# Patient Record
Sex: FEMALE | Employment: OTHER | URBAN - METROPOLITAN AREA
[De-identification: names, ages, dates, MRNs, and addresses within clinical notes are randomized per-mention and may not be internally consistent; named-entity substitution may affect disease eponyms.]

---

## 2014-05-08 LAB — DIABETIC RETINOPATHY: NEGATIVE

## 2017-05-10 PROBLEM — M79.621 PAIN OF RIGHT UPPER ARM: Status: ACTIVE | Noted: 2017-05-10

## 2018-10-30 LAB
AVERAGE GLUCOSE: NORMAL
CHOLESTEROL, TOTAL: 133 MG/DL
CHOLESTEROL/HDL RATIO: 2.8
CREATININE, URINE: 12.8
CREATININE: 0.9 MG/DL
HBA1C MFR BLD: 7.5 %
HDLC SERPL-MCNC: 48 MG/DL (ref 35–70)
LDL CHOLESTEROL CALCULATED: 58 MG/DL (ref 0–160)
MICROALBUMIN/CREAT 24H UR: 1.2 MG/G{CREAT}
MICROALBUMIN/CREAT UR-RTO: 94
POTASSIUM (K+): 3.6
TRIGL SERPL-MCNC: 199 MG/DL
VLDLC SERPL CALC-MCNC: NORMAL MG/DL

## 2019-03-26 VITALS
BODY MASS INDEX: 26.75 KG/M2 | DIASTOLIC BLOOD PRESSURE: 78 MMHG | TEMPERATURE: 98.6 F | WEIGHT: 151 LBS | SYSTOLIC BLOOD PRESSURE: 128 MMHG | HEART RATE: 80 BPM | HEIGHT: 63 IN

## 2019-03-26 RX ORDER — LIDOCAINE 50 MG/G
1 PATCH TOPICAL EVERY 12 HOURS
COMMUNITY
End: 2019-08-29

## 2019-03-26 RX ORDER — METOPROLOL SUCCINATE 50 MG/1
50 TABLET, EXTENDED RELEASE ORAL DAILY
COMMUNITY
End: 2019-05-15 | Stop reason: SDUPTHER

## 2019-03-26 RX ORDER — TRAMADOL HYDROCHLORIDE 50 MG/1
50 TABLET ORAL DAILY
COMMUNITY
End: 2019-11-13 | Stop reason: ALTCHOICE

## 2019-05-10 ENCOUNTER — HOSPITAL ENCOUNTER (OUTPATIENT)
Age: 84
Discharge: HOME | End: 2019-05-12
Payer: MEDICARE

## 2019-05-10 LAB
ALBUMIN SERPL-MCNC: 4.3 G/DL (ref 3.5–5.2)
ALP BLD-CCNC: 71 U/L (ref 35–104)
ALT SERPL-CCNC: 15 U/L (ref 0–32)
ANION GAP SERPL CALCULATED.3IONS-SCNC: 16 MMOL/L (ref 7–16)
AST SERPL-CCNC: 20 U/L (ref 0–31)
BACTERIA: ABNORMAL /HPF
BILIRUB SERPL-MCNC: 0.9 MG/DL (ref 0–1.2)
BILIRUBIN URINE: NEGATIVE
BLOOD, URINE: NEGATIVE
BUN BLDV-MCNC: 20 MG/DL (ref 8–23)
C-REACTIVE PROTEIN, HIGH SENSITIVITY: 3.1 MG/L (ref 0–3)
CALCIUM IONIZED: 1.33 MMOL/L (ref 1.15–1.33)
CALCIUM SERPL-MCNC: 9.9 MG/DL (ref 8.6–10.2)
CHLORIDE BLD-SCNC: 99 MMOL/L (ref 98–107)
CHOLESTEROL, TOTAL: 130 MG/DL (ref 0–199)
CLARITY: CLEAR
CO2: 24 MMOL/L (ref 22–29)
COLOR: YELLOW
CREAT SERPL-MCNC: 0.9 MG/DL (ref 0.5–1)
CREATININE URINE: 68 MG/DL (ref 29–226)
GFR AFRICAN AMERICAN: >60
GFR NON-AFRICAN AMERICAN: 59 ML/MIN/1.73
GLUCOSE BLD-MCNC: 179 MG/DL (ref 74–99)
GLUCOSE URINE: NEGATIVE MG/DL
HBA1C MFR BLD: 7.8 % (ref 4–5.6)
HCT VFR BLD CALC: 40.8 % (ref 34–48)
HDLC SERPL-MCNC: 50 MG/DL
HEMOGLOBIN: 13.6 G/DL (ref 11.5–15.5)
KETONES, URINE: NEGATIVE MG/DL
LDL CHOLESTEROL CALCULATED: 39 MG/DL (ref 0–99)
LEUKOCYTE ESTERASE, URINE: ABNORMAL
MAGNESIUM: 1.9 MG/DL (ref 1.6–2.6)
MCH RBC QN AUTO: 29.7 PG (ref 26–35)
MCHC RBC AUTO-ENTMCNC: 33.3 % (ref 32–34.5)
MCV RBC AUTO: 89.1 FL (ref 80–99.9)
MICROALBUMIN UR-MCNC: <12 MG/L
MICROALBUMIN/CREAT UR-RTO: ABNORMAL (ref 0–30)
NITRITE, URINE: NEGATIVE
PARATHYROID HORMONE INTACT: 65 PG/ML (ref 15–65)
PDW BLD-RTO: 13.1 FL (ref 11.5–15)
PH UA: 5.5 (ref 5–9)
PHOSPHORUS: 3.8 MG/DL (ref 2.5–4.5)
PLATELET # BLD: 213 E9/L (ref 130–450)
PMV BLD AUTO: 10.8 FL (ref 7–12)
POTASSIUM SERPL-SCNC: 3.8 MMOL/L (ref 3.5–5)
PROTEIN UA: NEGATIVE MG/DL
RBC # BLD: 4.58 E12/L (ref 3.5–5.5)
RBC UA: ABNORMAL /HPF (ref 0–2)
SODIUM BLD-SCNC: 139 MMOL/L (ref 132–146)
SPECIFIC GRAVITY UA: 1.01 (ref 1–1.03)
TOTAL PROTEIN: 6.9 G/DL (ref 6.4–8.3)
TRIGL SERPL-MCNC: 206 MG/DL (ref 0–149)
URIC ACID, SERUM: 4.1 MG/DL (ref 2.4–5.7)
UROBILINOGEN, URINE: 0.2 E.U./DL
VITAMIN D 25-HYDROXY: 35 NG/ML (ref 30–100)
VLDLC SERPL CALC-MCNC: 41 MG/DL
WBC # BLD: 9.8 E9/L (ref 4.5–11.5)
WBC UA: ABNORMAL /HPF (ref 0–5)

## 2019-05-10 PROCEDURE — 82044 UR ALBUMIN SEMIQUANTITATIVE: CPT

## 2019-05-10 PROCEDURE — 84100 ASSAY OF PHOSPHORUS: CPT

## 2019-05-10 PROCEDURE — 36415 COLL VENOUS BLD VENIPUNCTURE: CPT

## 2019-05-10 PROCEDURE — 82570 ASSAY OF URINE CREATININE: CPT

## 2019-05-10 PROCEDURE — 84550 ASSAY OF BLOOD/URIC ACID: CPT

## 2019-05-10 PROCEDURE — 83036 HEMOGLOBIN GLYCOSYLATED A1C: CPT

## 2019-05-10 PROCEDURE — 86141 C-REACTIVE PROTEIN HS: CPT

## 2019-05-10 PROCEDURE — 82330 ASSAY OF CALCIUM: CPT

## 2019-05-10 PROCEDURE — 83735 ASSAY OF MAGNESIUM: CPT

## 2019-05-10 PROCEDURE — 80053 COMPREHEN METABOLIC PANEL: CPT

## 2019-05-10 PROCEDURE — 85651 RBC SED RATE NONAUTOMATED: CPT

## 2019-05-10 PROCEDURE — 82306 VITAMIN D 25 HYDROXY: CPT

## 2019-05-10 PROCEDURE — 80061 LIPID PANEL: CPT

## 2019-05-10 PROCEDURE — 85027 COMPLETE CBC AUTOMATED: CPT

## 2019-05-10 PROCEDURE — 86140 C-REACTIVE PROTEIN: CPT

## 2019-05-10 PROCEDURE — 81001 URINALYSIS AUTO W/SCOPE: CPT

## 2019-05-10 PROCEDURE — 83970 ASSAY OF PARATHORMONE: CPT

## 2019-05-11 LAB
C-REACTIVE PROTEIN: 0.3 MG/DL (ref 0–0.4)
SEDIMENTATION RATE, ERYTHROCYTE: 1 MM/HR (ref 0–20)

## 2019-05-15 ENCOUNTER — TELEPHONE (OUTPATIENT)
Dept: RHEUMATOLOGY | Age: 84
End: 2019-05-15

## 2019-05-15 ENCOUNTER — OFFICE VISIT (OUTPATIENT)
Dept: PRIMARY CARE CLINIC | Age: 84
End: 2019-05-15
Payer: MEDICARE

## 2019-05-15 VITALS
HEIGHT: 63 IN | WEIGHT: 155 LBS | SYSTOLIC BLOOD PRESSURE: 130 MMHG | BODY MASS INDEX: 27.46 KG/M2 | DIASTOLIC BLOOD PRESSURE: 82 MMHG | HEART RATE: 80 BPM | OXYGEN SATURATION: 96 %

## 2019-05-15 DIAGNOSIS — I44.7 LBBB (LEFT BUNDLE BRANCH BLOCK): ICD-10-CM

## 2019-05-15 DIAGNOSIS — M85.80 OSTEOPENIA, UNSPECIFIED LOCATION: ICD-10-CM

## 2019-05-15 DIAGNOSIS — M51.9 INTERVERTEBRAL DISC DISORDER: ICD-10-CM

## 2019-05-15 DIAGNOSIS — I10 ESSENTIAL HYPERTENSION: ICD-10-CM

## 2019-05-15 DIAGNOSIS — E11.59 TYPE 2 DIABETES MELLITUS WITH OTHER CIRCULATORY COMPLICATION, WITH LONG-TERM CURRENT USE OF INSULIN (HCC): ICD-10-CM

## 2019-05-15 DIAGNOSIS — E55.9 VITAMIN D DEFICIENCY: ICD-10-CM

## 2019-05-15 DIAGNOSIS — F41.9 ANXIETY: ICD-10-CM

## 2019-05-15 DIAGNOSIS — E04.2 NONTOXIC MULTINODULAR GOITER: ICD-10-CM

## 2019-05-15 DIAGNOSIS — E78.2 MIXED HYPERLIPIDEMIA: ICD-10-CM

## 2019-05-15 DIAGNOSIS — E53.8 VITAMIN B12 DEFICIENCY: ICD-10-CM

## 2019-05-15 DIAGNOSIS — Z23 NEED FOR PROPHYLACTIC VACCINATION AND INOCULATION AGAINST VARICELLA: Primary | ICD-10-CM

## 2019-05-15 DIAGNOSIS — M35.3 POLYMYALGIA RHEUMATICA (HCC): ICD-10-CM

## 2019-05-15 DIAGNOSIS — Z79.4 TYPE 2 DIABETES MELLITUS WITH OTHER CIRCULATORY COMPLICATION, WITH LONG-TERM CURRENT USE OF INSULIN (HCC): ICD-10-CM

## 2019-05-15 DIAGNOSIS — M19.90 OSTEOARTHRITIS, UNSPECIFIED OSTEOARTHRITIS TYPE, UNSPECIFIED SITE: ICD-10-CM

## 2019-05-15 DIAGNOSIS — N18.30 STAGE 3 CHRONIC KIDNEY DISEASE (HCC): ICD-10-CM

## 2019-05-15 PROCEDURE — 81003 URINALYSIS AUTO W/O SCOPE: CPT | Performed by: FAMILY MEDICINE

## 2019-05-15 PROCEDURE — 99214 OFFICE O/P EST MOD 30 MIN: CPT | Performed by: FAMILY MEDICINE

## 2019-05-15 RX ORDER — AMLODIPINE BESYLATE 5 MG/1
5 TABLET ORAL 2 TIMES DAILY
Qty: 180 TABLET | Refills: 1 | Status: SHIPPED | OUTPATIENT
Start: 2019-05-15 | End: 2019-11-13 | Stop reason: SDUPTHER

## 2019-05-15 RX ORDER — ROSUVASTATIN CALCIUM 20 MG/1
20 TABLET, COATED ORAL DAILY
Qty: 90 TABLET | Refills: 1 | Status: SHIPPED | OUTPATIENT
Start: 2019-05-15 | End: 2019-11-13 | Stop reason: SDUPTHER

## 2019-05-15 RX ORDER — LISINOPRIL 20 MG/1
20 TABLET ORAL DAILY
Qty: 90 TABLET | Refills: 1 | Status: SHIPPED | OUTPATIENT
Start: 2019-05-15 | End: 2019-11-13 | Stop reason: SDUPTHER

## 2019-05-15 RX ORDER — POTASSIUM CHLORIDE 1.5 G/1.77G
10 POWDER, FOR SOLUTION ORAL 3 TIMES DAILY
Qty: 30 EACH | Refills: 3 | Status: SHIPPED | OUTPATIENT
Start: 2019-05-15 | End: 2019-07-30

## 2019-05-15 RX ORDER — HYDROCHLOROTHIAZIDE 12.5 MG/1
12.5 TABLET ORAL DAILY
Qty: 90 TABLET | Refills: 1 | Status: SHIPPED | OUTPATIENT
Start: 2019-05-15 | End: 2019-09-24 | Stop reason: SDUPTHER

## 2019-05-15 RX ORDER — METOPROLOL SUCCINATE 50 MG/1
50 TABLET, EXTENDED RELEASE ORAL DAILY
Qty: 90 TABLET | Refills: 1 | Status: SHIPPED | OUTPATIENT
Start: 2019-05-15 | End: 2019-11-13 | Stop reason: SDUPTHER

## 2019-05-15 ASSESSMENT — PATIENT HEALTH QUESTIONNAIRE - PHQ9
2. FEELING DOWN, DEPRESSED OR HOPELESS: 0
SUM OF ALL RESPONSES TO PHQ QUESTIONS 1-9: 0
1. LITTLE INTEREST OR PLEASURE IN DOING THINGS: 0
SUM OF ALL RESPONSES TO PHQ9 QUESTIONS 1 & 2: 0
SUM OF ALL RESPONSES TO PHQ QUESTIONS 1-9: 0

## 2019-05-15 ASSESSMENT — ENCOUNTER SYMPTOMS
COLOR CHANGE: 0
SINUS PRESSURE: 0
SINUS PAIN: 0
BACK PAIN: 1
EYE ITCHING: 0
SHORTNESS OF BREATH: 0
DIARRHEA: 0
BLOOD IN STOOL: 0
CONSTIPATION: 0
CHEST TIGHTNESS: 0
PHOTOPHOBIA: 0
NAUSEA: 0
SORE THROAT: 0
EYE REDNESS: 0
COUGH: 0
WHEEZING: 0
EYE PAIN: 0
ABDOMINAL PAIN: 0
VOMITING: 0
ABDOMINAL DISTENTION: 0

## 2019-05-15 NOTE — ASSESSMENT & PLAN NOTE
Recent renal functions normal, patient asking if she continues  to need to see nephrology, at this time I do not think she does, would however recommend maintaining adequate hydration and avoiding all NSAIDs.

## 2019-05-15 NOTE — PROGRESS NOTES
and previous complication of rib fractures. HPI: Diabetes, hyperlipidemia and hypertension. Taking medication as prescribed. No medication side effects noted. Diabetes: Diet: Somewhat compliant with diet plan. Foot Problems: Patient checks feet regularly. Reviewed bw in detail with patient, all questions addressed. Review of Systems   Constitutional: Negative for appetite change, fatigue, fever and unexpected weight change. HENT: Negative for congestion, ear discharge, ear pain, hearing loss, mouth sores, postnasal drip, sinus pressure, sinus pain, sneezing and sore throat. Eyes: Negative for photophobia, pain, redness and itching. Respiratory: Negative for cough, chest tightness, shortness of breath and wheezing. Cardiovascular: Negative for chest pain, palpitations and leg swelling. Gastrointestinal: Negative for abdominal distention, abdominal pain, blood in stool, constipation, diarrhea, nausea and vomiting. Endocrine: Negative for cold intolerance and heat intolerance. Genitourinary: Negative for difficulty urinating, dysuria, frequency, hematuria and urgency. Musculoskeletal: Positive for arthralgias, back pain, gait problem and myalgias. Negative for joint swelling, neck pain and neck stiffness. Skin: Negative for color change, pallor and wound. Allergic/Immunologic: Negative for environmental allergies and food allergies. Neurological: Negative for dizziness, seizures, syncope, weakness, light-headedness and headaches. Hematological: Negative for adenopathy. Psychiatric/Behavioral: Negative for agitation, confusion, dysphoric mood, sleep disturbance and suicidal ideas. The patient is not nervous/anxious and is not hyperactive.           Current Outpatient Medications:     rosuvastatin (CRESTOR) 20 MG tablet, Take 1 tablet by mouth daily, Disp: 90 tablet, Rfl: 1    potassium chloride (KLOR-CON) 20 MEQ packet, Take 10 mEq by mouth 3 times daily, Disp: 30 each, Rfl: 3   metoprolol succinate (TOPROL XL) 50 MG extended release tablet, Take 1 tablet by mouth daily, Disp: 90 tablet, Rfl: 1    lisinopril (PRINIVIL;ZESTRIL) 20 MG tablet, Take 1 tablet by mouth daily, Disp: 90 tablet, Rfl: 1    insulin lispro (HUMALOG KWIKPEN) 200 UNIT/ML SOPN pen, 14units q am, Disp: 3 pen, Rfl: 1    insulin glargine (BASAGLAR KWIKPEN) 100 UNIT/ML injection pen, Inject 20 Units into the skin nightly, Disp: 5 pen, Rfl: 1    hydrochlorothiazide (HYDRODIURIL) 12.5 MG tablet, Take 1 tablet by mouth daily, Disp: 90 tablet, Rfl: 1    amLODIPine (NORVASC) 5 MG tablet, Take 1 tablet by mouth 2 times daily, Disp: 180 tablet, Rfl: 1    zoster recombinant adjuvanted vaccine (SHINGRIX) 50 MCG/0.5ML SUSR injection, Inject 0.5 mLs into the muscle once for 1 dose 50 MCG IM then repeat 2-6 months., Disp: 1 each, Rfl: 1    ALPRAZolam (XANAX) 0.25 MG tablet, , Disp: , Rfl:     glucosamine-chondroitin (GLUCOSAMINE CHONDR COMPLEX) 500-400 MG CAPS, Take by mouth, Disp: , Rfl:     aspirin 81 MG tablet, Take 81 mg by mouth daily, Disp: , Rfl:     Coenzyme Q10 (CO Q 10) 10 MG CAPS, Take by mouth, Disp: , Rfl:     Calcium Carbonate-Vit D-Min (CALCIUM 1200 PO), Take by mouth, Disp: , Rfl:     Cranberry 200 MG CAPS, Take by mouth, Disp: , Rfl:     lidocaine (LIDODERM) 5 %, Place 1 patch onto the skin every 12 hours 12 hours on, 12 hours off., Disp: , Rfl:     traMADol (ULTRAM) 50 MG tablet, Take 50 mg by mouth daily. , Disp: , Rfl:     diclofenac sodium 1 % GEL, Apply pea size to right shoulder three times per day as needd, Disp: , Rfl:     metoprolol tartrate (LOPRESSOR) 25 MG tablet, Take 25 mg by mouth nightly, Disp: , Rfl:     predniSONE (DELTASONE) 5 MG tablet, Take 1 mg by mouth 3 times daily , Disp: , Rfl:     Probiotic Product (VSL#3 PO), Take 1 tablet by mouth 2 times daily, Disp: , Rfl:     Misc.  Devices (QUAD CANE) MISC, by Does not apply route, Disp: , Rfl:     vitamin D 1000 UNITS CAPS, Take 1 capsule by mouth 2 times daily, Disp: , Rfl:     Insulin Lispro Prot & Lispro (HUMALOG MIX 75/25 PEN SC), Inject into the skin Indications: 17 QAM; 5 at dinner, Disp: , Rfl:     Allergies   Allergen Reactions    Glucophage [Metformin Hcl] Diarrhea    Iv Dye [Iodides] Hives    Niacin     Niaspan [Niacin Er]      Night sweats    Pcn [Penicillins] Hives    Relafen [Nabumetone] Hives       Past Medical History:   Diagnosis Date    Anxiety     Chronic kidney disease     Decreased bone density     HTN (hypertension)     Hyperlipidemia     Hypertension     Intervertebral disc disorder     Nontoxic multinodular goiter     Osteoarthritis     Osteopenia     Polymyalgia rheumatica (HCC)     Type 2 diabetes mellitus (HCC)     Vitamin D deficiency     Wrist fracture, bilateral        Past Surgical History:   Procedure Laterality Date    APPENDECTOMY      HYSTERECTOMY         No family history on file.     Social History     Socioeconomic History    Marital status:      Spouse name: Not on file    Number of children: Not on file    Years of education: Not on file    Highest education level: Not on file   Occupational History    Not on file   Social Needs    Financial resource strain: Not on file    Food insecurity:     Worry: Not on file     Inability: Not on file    Transportation needs:     Medical: Not on file     Non-medical: Not on file   Tobacco Use    Smoking status: Never Smoker    Smokeless tobacco: Never Used   Substance and Sexual Activity    Alcohol use: No    Drug use: No    Sexual activity: Not on file   Lifestyle    Physical activity:     Days per week: Not on file     Minutes per session: Not on file    Stress: Not on file   Relationships    Social connections:     Talks on phone: Not on file     Gets together: Not on file     Attends Buddhist service: Not on file     Active member of club or organization: Not on file     Attends meetings of clubs or organizations: Not on file     Relationship status: Not on file    Intimate partner violence:     Fear of current or ex partner: Not on file     Emotionally abused: Not on file     Physically abused: Not on file     Forced sexual activity: Not on file   Other Topics Concern    Not on file   Social History Narrative    Not on file       Vitals:    05/15/19 1008   BP: 130/82   Pulse: 80   SpO2: 96%   Weight: 155 lb (70.3 kg)   Height: 5' 3\" (1.6 m)       Exam:  Physical Exam   Constitutional: She is oriented to person, place, and time. She appears well-developed and well-nourished. HENT:   Head: Normocephalic and atraumatic. Right Ear: External ear normal.   Left Ear: External ear normal.   Nose: Nose normal.   Mouth/Throat: Oropharynx is clear and moist.   B/l hearing aids present   Eyes: Pupils are equal, round, and reactive to light. Conjunctivae and EOM are normal.   Neck: Normal range of motion. Neck supple. Cardiovascular: Normal rate, regular rhythm, normal heart sounds and intact distal pulses. Pulmonary/Chest: Effort normal and breath sounds normal.   Abdominal: Soft. Bowel sounds are normal.   Musculoskeletal: Normal range of motion. Walks with a slow labored gait   Neurological: She is alert and oriented to person, place, and time. Weakness noted b/l lower ext, uses quad cane for ambulation purposes   Skin: Skin is warm and dry. No rash noted. Psychiatric: She has a normal mood and affect.        Assessment and Plan:    Problem List        Circulatory    Hypertension    Relevant Medications    metoprolol tartrate (LOPRESSOR) 25 MG tablet    aspirin 81 MG tablet    rosuvastatin (CRESTOR) 20 MG tablet    potassium chloride (KLOR-CON) 20 MEQ packet    metoprolol succinate (TOPROL XL) 50 MG extended release tablet    lisinopril (PRINIVIL;ZESTRIL) 20 MG tablet    hydrochlorothiazide (HYDRODIURIL) 12.5 MG tablet    amLODIPine (NORVASC) 5 MG tablet    Other Relevant Orders    Basic Metabolic Panel    CBC Auto Differential    Urinalysis    Type 2 diabetes mellitus with circulatory disorder, with long-term current use of insulin (HCC)     Recent hemoglobin D8A, and salicylate acid by endocrinologist, continue to follow endo. Patient up-to-date on diabetic eye exams and performs daily foot checks. Relevant Medications    metoprolol tartrate (LOPRESSOR) 25 MG tablet    aspirin 81 MG tablet    Insulin Lispro Prot & Lispro (HUMALOG MIX 75/25 PEN SC)    rosuvastatin (CRESTOR) 20 MG tablet    metoprolol succinate (TOPROL XL) 50 MG extended release tablet    lisinopril (PRINIVIL;ZESTRIL) 20 MG tablet    insulin lispro (HUMALOG KWIKPEN) 200 UNIT/ML SOPN pen    insulin glargine (BASAGLAR KWIKPEN) 100 UNIT/ML injection pen    hydrochlorothiazide (HYDRODIURIL) 12.5 MG tablet    amLODIPine (NORVASC) 5 MG tablet    LBBB (left bundle branch block)    Relevant Medications    metoprolol tartrate (LOPRESSOR) 25 MG tablet    aspirin 81 MG tablet    rosuvastatin (CRESTOR) 20 MG tablet    metoprolol succinate (TOPROL XL) 50 MG extended release tablet    lisinopril (PRINIVIL;ZESTRIL) 20 MG tablet    hydrochlorothiazide (HYDRODIURIL) 12.5 MG tablet    amLODIPine (NORVASC) 5 MG tablet       Endocrine    Nontoxic multinodular goiter    Relevant Orders    TSH without Reflex    T4, Free       Musculoskeletal and Integument    Osteopenia    Intervertebral disc disorder    Osteoarthritis    Relevant Medications    predniSONE (DELTASONE) 5 MG tablet    aspirin 81 MG tablet    triamcinolone acetonide (KENALOG-40) injection 40 mg (Completed)    traMADol (ULTRAM) 50 MG tablet       Genitourinary    Stage 3 chronic kidney disease (HCC)     Recent renal functions normal, patient asking if she continues  to need to see nephrology, at this time I do not think she does, would however recommend maintaining adequate hydration and avoiding all NSAIDs.             Other    Vitamin D deficiency    Relevant Orders    Vitamin D 25 Hydroxy    Hyperlipidemia Relevant Medications    metoprolol tartrate (LOPRESSOR) 25 MG tablet    aspirin 81 MG tablet    rosuvastatin (CRESTOR) 20 MG tablet    metoprolol succinate (TOPROL XL) 50 MG extended release tablet    lisinopril (PRINIVIL;ZESTRIL) 20 MG tablet    hydrochlorothiazide (HYDRODIURIL) 12.5 MG tablet    amLODIPine (NORVASC) 5 MG tablet    Other Relevant Orders    Lipid Panel    ALT    AST    Polymyalgia rheumatica (HCC)    Anxiety    Relevant Medications    ALPRAZolam (XANAX) 0.25 MG tablet    Need for prophylactic vaccination and inoculation against varicella - Primary    Relevant Medications    zoster recombinant adjuvanted vaccine [facility]) 50 MCG/0.5ML SUSR injection    Vitamin B12 deficiency    Relevant Orders    Vitamin B12           Return in about 6 months (around 11/15/2019) for 30 minute appt always.     Fasting bw prior  Orders Placed This Encounter   Procedures    Basic Metabolic Panel     Standing Status:   Future     Standing Expiration Date:   5/15/2020    CBC Auto Differential     Standing Status:   Future     Standing Expiration Date:   5/15/2020    Hemoglobin A1C     Standing Status:   Future     Standing Expiration Date:   5/15/2020    Lipid Panel     Standing Status:   Future     Standing Expiration Date:   5/15/2020     Order Specific Question:   Is Patient Fasting?/# of Hours     Answer:   12    Vitamin B12     Standing Status:   Future     Standing Expiration Date:   5/15/2020    ALT     Standing Status:   Future     Standing Expiration Date:   5/15/2020    AST     Standing Status:   Future     Standing Expiration Date:   5/15/2020    Urinalysis     Standing Status:   Future     Standing Expiration Date:   5/15/2020    MICROALBUMIN / CREATININE URINE RATIO     Standing Status:   Future     Standing Expiration Date:   5/15/2020    Vitamin D 25 Hydroxy     Standing Status:   Future     Standing Expiration Date:   5/15/2020    TSH without Reflex     Standing Status:   Future     Standing Expiration Date:   5/15/2020    T4, Free     Standing Status:   Future     Standing Expiration Date:   5/15/2020       Bessy Javed MD  5/15/2019  12:22 PM

## 2019-05-23 ENCOUNTER — OFFICE VISIT (OUTPATIENT)
Dept: RHEUMATOLOGY | Age: 84
End: 2019-05-23
Payer: MEDICARE

## 2019-05-23 VITALS
OXYGEN SATURATION: 96 % | SYSTOLIC BLOOD PRESSURE: 128 MMHG | HEART RATE: 62 BPM | TEMPERATURE: 97 F | WEIGHT: 155.4 LBS | HEIGHT: 63 IN | BODY MASS INDEX: 27.54 KG/M2 | DIASTOLIC BLOOD PRESSURE: 66 MMHG

## 2019-05-23 DIAGNOSIS — M35.3 POLYMYALGIA RHEUMATICA (HCC): Primary | ICD-10-CM

## 2019-05-23 DIAGNOSIS — M75.01 ADHESIVE CAPSULITIS OF RIGHT SHOULDER: ICD-10-CM

## 2019-05-23 PROCEDURE — 99214 OFFICE O/P EST MOD 30 MIN: CPT | Performed by: INTERNAL MEDICINE

## 2019-05-23 RX ORDER — PREDNISONE 1 MG/1
TABLET ORAL
Qty: 270 TABLET | Refills: 1 | Status: SHIPPED | OUTPATIENT
Start: 2019-05-23 | End: 2019-08-29 | Stop reason: SDUPTHER

## 2019-05-23 ASSESSMENT — ENCOUNTER SYMPTOMS
EYE ITCHING: 0
SHORTNESS OF BREATH: 0
COUGH: 0
WHEEZING: 0
BACK PAIN: 0
BLOOD IN STOOL: 0
SORE THROAT: 0
VOMITING: 0
PHOTOPHOBIA: 0
CONSTIPATION: 0
EYE PAIN: 0
ABDOMINAL PAIN: 0
EYE REDNESS: 0
CHEST TIGHTNESS: 0
DIARRHEA: 0

## 2019-05-23 ASSESSMENT — ROUTINE ASSESSMENT OF PATIENT INDEX DATA (RAPID3)
ON A SCALE OF ONE TO TEN, HOW MUCH PAIN HAVE YOU HAD BECAUSE OF YOUR CONDITION OVER THE PAST WEEK?: 7
TOTAL RAPID3 SCORE: 13
ON A SCALE OF ONE TO TEN, CONSIDERING ALL THE WAYS IN WHICH ILLNESS AND HEALTH CONDITIONS MAY AFFECT YOU AT THIS TIME, PLEASE INDICATE BELOW HOW YOU ARE DOING:: 6

## 2019-05-23 NOTE — PROGRESS NOTES
  predniSONE (DELTASONE) 1 MG tablet, Take 3 mg once daily, Disp: 270 tablet, Rfl: 1    rosuvastatin (CRESTOR) 20 MG tablet, Take 1 tablet by mouth daily, Disp: 90 tablet, Rfl: 1    potassium chloride (KLOR-CON) 20 MEQ packet, Take 10 mEq by mouth 3 times daily, Disp: 30 each, Rfl: 3    metoprolol succinate (TOPROL XL) 50 MG extended release tablet, Take 1 tablet by mouth daily, Disp: 90 tablet, Rfl: 1    lisinopril (PRINIVIL;ZESTRIL) 20 MG tablet, Take 1 tablet by mouth daily, Disp: 90 tablet, Rfl: 1    insulin lispro (HUMALOG KWIKPEN) 200 UNIT/ML SOPN pen, 14units q am, Disp: 3 pen, Rfl: 1    insulin glargine (BASAGLAR KWIKPEN) 100 UNIT/ML injection pen, Inject 20 Units into the skin nightly, Disp: 5 pen, Rfl: 1    hydrochlorothiazide (HYDRODIURIL) 12.5 MG tablet, Take 1 tablet by mouth daily, Disp: 90 tablet, Rfl: 1    amLODIPine (NORVASC) 5 MG tablet, Take 1 tablet by mouth 2 times daily, Disp: 180 tablet, Rfl: 1    lidocaine (LIDODERM) 5 %, Place 1 patch onto the skin every 12 hours 12 hours on, 12 hours off., Disp: , Rfl:     traMADol (ULTRAM) 50 MG tablet, Take 50 mg by mouth daily. , Disp: , Rfl:     diclofenac sodium 1 % GEL, Apply pea size to right shoulder three times per day as needd, Disp: , Rfl:     ALPRAZolam (XANAX) 0.25 MG tablet, , Disp: , Rfl:     Probiotic Product (VSL#3 PO), Take 1 tablet by mouth 2 times daily, Disp: , Rfl:     Misc.  Devices (QUAD CANE) MISC, by Does not apply route, Disp: , Rfl:     vitamin D 1000 UNITS CAPS, Take 1 capsule by mouth 2 times daily, Disp: , Rfl:     glucosamine-chondroitin (GLUCOSAMINE CHONDR COMPLEX) 500-400 MG CAPS, Take by mouth, Disp: , Rfl:     aspirin 81 MG tablet, Take 81 mg by mouth daily, Disp: , Rfl:     Coenzyme Q10 (CO Q 10) 10 MG CAPS, Take by mouth, Disp: , Rfl:     Insulin Lispro Prot & Lispro (HUMALOG MIX 75/25 PEN SC), Inject into the skin Indications: 17 QAM; 5 at dinner, Disp: , Rfl:     Calcium Carbonate-Vit D-Min (CALCIUM 1200 PO), Take by mouth, Disp: , Rfl:     Cranberry 200 MG CAPS, Take by mouth, Disp: , Rfl:   Allergies   Allergen Reactions    Glucophage [Metformin Hcl] Diarrhea    Iv Dye [Iodides] Hives    Niacin     Niaspan [Niacin Er]      Night sweats    Pcn [Penicillins] Hives    Relafen [Nabumetone] Hives           Past Medical History:   Diagnosis Date    Anxiety     Chronic kidney disease     Decreased bone density     HTN (hypertension)     Hyperlipidemia     Hypertension     Intervertebral disc disorder     Nontoxic multinodular goiter     Osteoarthritis     Osteopenia     Polymyalgia rheumatica (HCC)     Type 2 diabetes mellitus ([facility] [address]     Vitamin D deficiency     Wrist fracture, bilateral      No family history on file.    Past Surgical History:   Procedure Laterality Date    APPENDECTOMY      HYSTERECTOMY        Social History     Socioeconomic History    Marital status:      Spouse name: Not on file    Number of children: Not on file    Years of education: Not on file    Highest education level: Not on file   Occupational History    Not on file   Social Needs    Financial resource strain: Not on file    Food insecurity:     Worry: Not on file     Inability: Not on file    Transportation needs:     Medical: Not on file     Non-medical: Not on file   Tobacco Use    Smoking status: Never Smoker    Smokeless tobacco: Never Used   Substance and Sexual Activity    Alcohol use: No    Drug use: Never    Sexual activity: Not Currently     Partners: Male     Birth control/protection: Post-menopausal   Lifestyle    Physical activity:     Days per week: Not on file     Minutes per session: Not on file    Stress: Not on file   Relationships    Social connections:     Talks on phone: Not on file     Gets together: Not on file     Attends Worship service: Not on file     Active member of club or organization: Not on file     Attends meetings of clubs or organizations: Not on

## 2019-06-19 DIAGNOSIS — F41.9 ANXIETY: Primary | ICD-10-CM

## 2019-06-19 RX ORDER — ALPRAZOLAM 0.25 MG/1
0.25 TABLET ORAL NIGHTLY PRN
Qty: 30 TABLET | Refills: 0 | Status: SHIPPED | OUTPATIENT
Start: 2019-06-19 | End: 2019-07-19

## 2019-06-25 ENCOUNTER — TELEPHONE (OUTPATIENT)
Dept: PRIMARY CARE CLINIC | Age: 84
End: 2019-06-25

## 2019-06-25 ENCOUNTER — TELEPHONE (OUTPATIENT)
Dept: FAMILY MEDICINE CLINIC | Age: 84
End: 2019-06-25

## 2019-06-25 ENCOUNTER — TELEPHONE (OUTPATIENT)
Dept: RHEUMATOLOGY | Age: 84
End: 2019-06-25

## 2019-06-25 RX ORDER — PREDNISONE 10 MG/1
TABLET ORAL
Qty: 80 TABLET | Refills: 0 | Status: SHIPPED | OUTPATIENT
Start: 2019-06-25 | End: 2019-11-13

## 2019-06-25 NOTE — TELEPHONE ENCOUNTER
Pt calling in multiple times this date stating she is \"sore all over. \" wants to know what she can do for this

## 2019-06-25 NOTE — TELEPHONE ENCOUNTER
Looks like she has always taken one 20mg tablet daily. Check with pharmacy and make sure that she has not been refilling it sooner than prescribed.

## 2019-07-12 ENCOUNTER — TELEPHONE (OUTPATIENT)
Dept: FAMILY MEDICINE CLINIC | Age: 84
End: 2019-07-12

## 2019-07-15 NOTE — TELEPHONE ENCOUNTER
Is she completely off , if yes will monitor her for now, , go back to previous dose of prednisone if she develops recurrence of symptoms.

## 2019-07-29 ENCOUNTER — TELEPHONE (OUTPATIENT)
Dept: RHEUMATOLOGY | Age: 84
End: 2019-07-29

## 2019-07-29 NOTE — TELEPHONE ENCOUNTER
Pt has been off of prednisone since 7/12.  States she \"doesn't feel as good as she was\" and wants to know what she should do

## 2019-07-30 ENCOUNTER — PROCEDURE VISIT (OUTPATIENT)
Dept: PODIATRY | Age: 84
End: 2019-07-30
Payer: MEDICARE

## 2019-07-30 VITALS
SYSTOLIC BLOOD PRESSURE: 132 MMHG | HEIGHT: 64 IN | BODY MASS INDEX: 26.29 KG/M2 | DIASTOLIC BLOOD PRESSURE: 82 MMHG | WEIGHT: 154 LBS

## 2019-07-30 DIAGNOSIS — I73.9 PVD (PERIPHERAL VASCULAR DISEASE) (HCC): ICD-10-CM

## 2019-07-30 DIAGNOSIS — M79.674 PAIN OF TOE OF RIGHT FOOT: ICD-10-CM

## 2019-07-30 DIAGNOSIS — R26.2 DIFFICULTY WALKING: ICD-10-CM

## 2019-07-30 DIAGNOSIS — M21.371 FOOT DROP, RIGHT FOOT: ICD-10-CM

## 2019-07-30 DIAGNOSIS — B35.1 ONYCHOMYCOSIS: Primary | ICD-10-CM

## 2019-07-30 DIAGNOSIS — E11.51 TYPE II DIABETES MELLITUS WITH PERIPHERAL CIRCULATORY DISORDER (HCC): ICD-10-CM

## 2019-07-30 DIAGNOSIS — M79.675 PAIN OF TOE OF LEFT FOOT: ICD-10-CM

## 2019-07-30 PROCEDURE — 11721 DEBRIDE NAIL 6 OR MORE: CPT | Performed by: PODIATRIST

## 2019-07-30 RX ORDER — POTASSIUM CHLORIDE 750 MG/1
CAPSULE, EXTENDED RELEASE ORAL
Qty: 270 CAPSULE | Refills: 3 | Status: SHIPPED | OUTPATIENT
Start: 2019-07-30 | End: 2019-11-13 | Stop reason: SDUPTHER

## 2019-07-30 RX ORDER — POTASSIUM CHLORIDE 750 MG/1
CAPSULE, EXTENDED RELEASE ORAL
COMMUNITY
End: 2019-07-30 | Stop reason: SDUPTHER

## 2019-07-30 NOTE — PROGRESS NOTES
19     Dilip Delicia    : 1928  Sex: female  Age: 80 y.o. Subjective: The patient is seen today for evaluation regarding Diabetic foot evaluation and mycotic nail care. No other complaints noted. Chief Complaint   Patient presents with    Nail Problem    Callouses       Current Medications:    Current Outpatient Medications:     potassium chloride (MICRO-K) 10 MEQ extended release capsule, , Disp: , Rfl:     predniSONE (DELTASONE) 10 MG tablet, Take 40 mg of Prednisone once a day for 5 days and decrease by 10 mg every 5 days. , Disp: 80 tablet, Rfl: 0    aspirin 81 MG tablet, Take 1 tablet by mouth daily, Disp: 90 tablet, Rfl: 1    predniSONE (DELTASONE) 1 MG tablet, Take 3 mg once daily, Disp: 270 tablet, Rfl: 1    rosuvastatin (CRESTOR) 20 MG tablet, Take 1 tablet by mouth daily, Disp: 90 tablet, Rfl: 1    potassium chloride (KLOR-CON) 20 MEQ packet, Take 10 mEq by mouth 3 times daily, Disp: 30 each, Rfl: 3    metoprolol succinate (TOPROL XL) 50 MG extended release tablet, Take 1 tablet by mouth daily, Disp: 90 tablet, Rfl: 1    lisinopril (PRINIVIL;ZESTRIL) 20 MG tablet, Take 1 tablet by mouth daily, Disp: 90 tablet, Rfl: 1    insulin lispro (HUMALOG KWIKPEN) 200 UNIT/ML SOPN pen, 14units q am, Disp: 3 pen, Rfl: 1    insulin glargine (BASAGLAR KWIKPEN) 100 UNIT/ML injection pen, Inject 20 Units into the skin nightly, Disp: 5 pen, Rfl: 1    hydrochlorothiazide (HYDRODIURIL) 12.5 MG tablet, Take 1 tablet by mouth daily, Disp: 90 tablet, Rfl: 1    amLODIPine (NORVASC) 5 MG tablet, Take 1 tablet by mouth 2 times daily, Disp: 180 tablet, Rfl: 1    lidocaine (LIDODERM) 5 %, Place 1 patch onto the skin every 12 hours 12 hours on, 12 hours off., Disp: , Rfl:     traMADol (ULTRAM) 50 MG tablet, Take 50 mg by mouth daily. , Disp: , Rfl:     diclofenac sodium 1 % GEL, Apply pea size to right shoulder three times per day as needd, Disp: , Rfl:     Probiotic Product (VSL#3 PO), Take digital areas bilateral foot. No heel fissuring or macerations of the web spaces. No plantar calluses and/or ulcerative areas are noted. Patient is having difficulty with gait/walking. Plan Per Assessment  Estela Noland was seen today for nail problem and callouses. Diagnoses and all orders for this visit:    Onychomycosis    Foot drop, right foot  -     Amb External Referral For Orthotics    Pain of toe of right foot    Pain of toe of left foot    PVD (peripheral vascular disease) ([address]    Type II diabetes mellitus with peripheral circulatory disorder (HCC)    Difficulty walking        1. Evaluation and Management  2. Manual and electrical debridement of the mycotic nails was performed for thickness and length to prevent injection and/or ulceration. 3. I recommended antifungal cream to the nails daily. 4. It was discussed in detail with the patient proper caring for the vascular compromised foot. The fact that they have compromised blood flow put the patient at risk for infection/gangrene/amputation. The patient should not walk barefoot. Shoe gear should fit properly and socks should be worn with shoes. Exercise is very important to prevent worsening of the disease process but before performing an exercise program should check with their family physician first.  If any skin lesions are noted, they are instructed to contact the office immediately. 5. It was discussed in detail with the patient proper hygiene for the diabetic foot. They are to get in the habit of looking at their feet or have someone look at them. If they are unable to do daily, they are to look for any signs of redness, blistering, cracking, swelling, drainage, open lesions, etc.  They are to dry in between the toes after each bath or shower gently. The water should be tested with the elbow to prevent burns. The patient is to refrain from soaking their feet unless instructed by myself to do otherwise.   They are to refrain from going barefoot. Shoe gear should be inspected for any foreign objects. Shoes should have a deep wide toe box. With any type of shoe, the feet should be inspected for any signs of pressure, i.e. redness, blistering, or open sores. Further instructional guidelines were dispensed to the patient. 6. We will see the patient back at a later date for continued podiatric management and care. Patient was advised to call the office with any questions or concerns prior to their next appointment if needed. Seen By:    Maci Eckert DPM    Electronically signed by Maci Eckert DPM on 7/30/2019 at 9:05 AM      This note was created using voice recognition software. The note was reviewed however may contain grammatical errors.

## 2019-08-29 ENCOUNTER — OFFICE VISIT (OUTPATIENT)
Dept: RHEUMATOLOGY | Age: 84
End: 2019-08-29
Payer: MEDICARE

## 2019-08-29 VITALS
TEMPERATURE: 97.2 F | HEIGHT: 64 IN | OXYGEN SATURATION: 96 % | BODY MASS INDEX: 26.29 KG/M2 | HEART RATE: 64 BPM | DIASTOLIC BLOOD PRESSURE: 72 MMHG | WEIGHT: 154 LBS | RESPIRATION RATE: 18 BRPM | SYSTOLIC BLOOD PRESSURE: 134 MMHG

## 2019-08-29 DIAGNOSIS — M35.3 POLYMYALGIA RHEUMATICA (HCC): Primary | ICD-10-CM

## 2019-08-29 PROCEDURE — 99214 OFFICE O/P EST MOD 30 MIN: CPT | Performed by: INTERNAL MEDICINE

## 2019-08-29 RX ORDER — PREDNISONE 1 MG/1
TABLET ORAL
Qty: 270 TABLET | Refills: 1 | Status: SHIPPED | OUTPATIENT
Start: 2019-08-29 | End: 2019-11-13

## 2019-08-29 RX ORDER — LIDOCAINE 50 MG/G
1 PATCH TOPICAL DAILY
Qty: 30 PATCH | Refills: 1 | Status: SHIPPED | OUTPATIENT
Start: 2019-08-29 | End: 2019-09-28

## 2019-08-29 ASSESSMENT — ENCOUNTER SYMPTOMS
CHEST TIGHTNESS: 0
SORE THROAT: 0
ABDOMINAL PAIN: 0
WHEEZING: 0
COUGH: 0
BACK PAIN: 0
BLOOD IN STOOL: 0
CONSTIPATION: 0
VOMITING: 0
EYE PAIN: 0
PHOTOPHOBIA: 0
EYE ITCHING: 0
SHORTNESS OF BREATH: 0
DIARRHEA: 0
EYE REDNESS: 0

## 2019-08-29 NOTE — PROGRESS NOTES
dizziness, seizures, syncope, weakness, light-headedness, numbness and headaches. Hematological: Negative for adenopathy. Does not bruise/bleed easily. Psychiatric/Behavioral: The patient is not nervous/anxious. Current Outpatient Medications:     lidocaine (LIDODERM) 5 %, Place 1 patch onto the skin daily 12 hours on, 12 hours off., Disp: 30 patch, Rfl: 1    predniSONE (DELTASONE) 1 MG tablet, Take 3 mg once daily, Disp: 270 tablet, Rfl: 1    potassium chloride (MICRO-K) 10 MEQ extended release capsule, 3 po daily, Disp: 270 capsule, Rfl: 3    predniSONE (DELTASONE) 10 MG tablet, Take 40 mg of Prednisone once a day for 5 days and decrease by 10 mg every 5 days. , Disp: 80 tablet, Rfl: 0    aspirin 81 MG tablet, Take 1 tablet by mouth daily, Disp: 90 tablet, Rfl: 1    rosuvastatin (CRESTOR) 20 MG tablet, Take 1 tablet by mouth daily, Disp: 90 tablet, Rfl: 1    metoprolol succinate (TOPROL XL) 50 MG extended release tablet, Take 1 tablet by mouth daily, Disp: 90 tablet, Rfl: 1    lisinopril (PRINIVIL;ZESTRIL) 20 MG tablet, Take 1 tablet by mouth daily, Disp: 90 tablet, Rfl: 1    insulin lispro (HUMALOG KWIKPEN) 200 UNIT/ML SOPN pen, 14units q am, Disp: 3 pen, Rfl: 1    insulin glargine (BASAGLAR KWIKPEN) 100 UNIT/ML injection pen, Inject 20 Units into the skin nightly, Disp: 5 pen, Rfl: 1    hydrochlorothiazide (HYDRODIURIL) 12.5 MG tablet, Take 1 tablet by mouth daily, Disp: 90 tablet, Rfl: 1    amLODIPine (NORVASC) 5 MG tablet, Take 1 tablet by mouth 2 times daily, Disp: 180 tablet, Rfl: 1    traMADol (ULTRAM) 50 MG tablet, Take 50 mg by mouth daily. , Disp: , Rfl:     diclofenac sodium 1 % GEL, Apply pea size to right shoulder three times per day as needd, Disp: , Rfl:     Probiotic Product (VSL#3 PO), Take 1 tablet by mouth 2 times daily, Disp: , Rfl:     Misc.  Devices (QUAD CANE) MISC, by Does not apply route, Disp: , Rfl:     vitamin D 1000 UNITS CAPS, Take 1 capsule by mouth 2

## 2019-09-04 ENCOUNTER — TELEPHONE (OUTPATIENT)
Dept: PRIMARY CARE CLINIC | Age: 84
End: 2019-09-04

## 2019-09-04 DIAGNOSIS — F41.9 ANXIETY: Primary | ICD-10-CM

## 2019-09-04 RX ORDER — ALPRAZOLAM 0.25 MG/1
0.25 TABLET ORAL NIGHTLY PRN
COMMUNITY
End: 2019-09-04 | Stop reason: SDUPTHER

## 2019-09-04 RX ORDER — ALPRAZOLAM 0.25 MG/1
0.25 TABLET ORAL NIGHTLY PRN
Qty: 30 TABLET | Refills: 0 | Status: SHIPPED | OUTPATIENT
Start: 2019-09-04 | End: 2019-11-27 | Stop reason: SDUPTHER

## 2019-09-17 ENCOUNTER — PROCEDURE VISIT (OUTPATIENT)
Dept: PODIATRY | Age: 84
End: 2019-09-17
Payer: MEDICARE

## 2019-09-17 VITALS — RESPIRATION RATE: 18 BRPM | BODY MASS INDEX: 30.82 KG/M2 | WEIGHT: 157 LBS | HEIGHT: 60 IN

## 2019-09-17 DIAGNOSIS — E11.51 TYPE II DIABETES MELLITUS WITH PERIPHERAL CIRCULATORY DISORDER (HCC): ICD-10-CM

## 2019-09-17 DIAGNOSIS — L60.0 OC (ONYCHOCRYPTOSIS): Primary | ICD-10-CM

## 2019-09-17 DIAGNOSIS — M79.674 PAIN OF TOE OF RIGHT FOOT: ICD-10-CM

## 2019-09-17 PROCEDURE — G8428 CUR MEDS NOT DOCUMENT: HCPCS | Performed by: PODIATRIST

## 2019-09-17 PROCEDURE — 1036F TOBACCO NON-USER: CPT | Performed by: PODIATRIST

## 2019-09-17 PROCEDURE — G8417 CALC BMI ABV UP PARAM F/U: HCPCS | Performed by: PODIATRIST

## 2019-09-17 PROCEDURE — 1123F ACP DISCUSS/DSCN MKR DOCD: CPT | Performed by: PODIATRIST

## 2019-09-17 PROCEDURE — 1090F PRES/ABSN URINE INCON ASSESS: CPT | Performed by: PODIATRIST

## 2019-09-17 PROCEDURE — 4040F PNEUMOC VAC/ADMIN/RCVD: CPT | Performed by: PODIATRIST

## 2019-09-17 PROCEDURE — 99213 OFFICE O/P EST LOW 20 MIN: CPT | Performed by: PODIATRIST

## 2019-09-17 NOTE — PROGRESS NOTES
peripheral circulatory disorder ([facility] [address]      1. Evaluation and management  2. Treatment ingrown nail border was performed to patient tolerance. No nail matrix procedure performed on today's visit. It was discussed in detail with the patient proper hygiene for the diabetic foot. They are to get in the habit of looking at their feet or have someone look at them. If they are unable to do daily, they are to look for any signs of redness, blistering, cracking, swelling, drainage, open lesions, etc.  They are to dry in between the toes after each bath or shower gently. The water should be tested with the elbow to prevent burns. The patient is to refrain from soaking their feet unless instructed by myself to do otherwise. They are to refrain from going barefoot. Shoe gear should be inspected for any foreign objects. Shoes should have a deep wide toe box. With any type of shoe, the feet should be inspected for any signs of pressure, i.e. redness, blistering, or open sores. Further instructional guidelines were dispensed to the patient. Patient will be followed up for her regular scheduled diabetic foot care appointment or sooner if needed. Seen By:    Carolina Mendez DPM    Electronically signed by Carolina Mendez DPM on 9/17/2019 at 11:29 AM    This note was created using voice recognition software. The note was reviewed however may contain grammatical errors.

## 2019-09-24 ENCOUNTER — TELEPHONE (OUTPATIENT)
Dept: PRIMARY CARE CLINIC | Age: 84
End: 2019-09-24

## 2019-09-24 DIAGNOSIS — I10 ESSENTIAL HYPERTENSION: ICD-10-CM

## 2019-09-24 DIAGNOSIS — M35.3 POLYMYALGIA RHEUMATICA (HCC): Primary | ICD-10-CM

## 2019-09-24 RX ORDER — HYDROCHLOROTHIAZIDE 12.5 MG/1
12.5 TABLET ORAL DAILY
Qty: 90 TABLET | Refills: 1 | Status: SHIPPED | OUTPATIENT
Start: 2019-09-24 | End: 2019-11-13 | Stop reason: SDUPTHER

## 2019-10-01 ENCOUNTER — TELEPHONE (OUTPATIENT)
Dept: PRIMARY CARE CLINIC | Age: 84
End: 2019-10-01

## 2019-10-25 ENCOUNTER — HOSPITAL ENCOUNTER (OUTPATIENT)
Age: 84
Discharge: HOME | End: 2019-10-27
Payer: MEDICARE

## 2019-10-25 LAB
C-REACTIVE PROTEIN: 0.7 MG/DL (ref 0–0.4)
SEDIMENTATION RATE, ERYTHROCYTE: 2 MM/HR (ref 0–20)
VITAMIN D 25-HYDROXY: 33 NG/ML (ref 30–100)

## 2019-10-25 PROCEDURE — 86140 C-REACTIVE PROTEIN: CPT

## 2019-10-25 PROCEDURE — 36415 COLL VENOUS BLD VENIPUNCTURE: CPT

## 2019-10-25 PROCEDURE — 85651 RBC SED RATE NONAUTOMATED: CPT

## 2019-10-25 PROCEDURE — 82306 VITAMIN D 25 HYDROXY: CPT

## 2019-11-05 ENCOUNTER — TELEPHONE (OUTPATIENT)
Dept: PRIMARY CARE CLINIC | Age: 84
End: 2019-11-05

## 2019-11-05 ENCOUNTER — PROCEDURE VISIT (OUTPATIENT)
Dept: PODIATRY | Age: 84
End: 2019-11-05
Payer: MEDICARE

## 2019-11-05 VITALS — BODY MASS INDEX: 26.63 KG/M2 | WEIGHT: 156 LBS | RESPIRATION RATE: 18 BRPM | HEIGHT: 64 IN

## 2019-11-05 DIAGNOSIS — B35.1 ONYCHOMYCOSIS: Primary | ICD-10-CM

## 2019-11-05 DIAGNOSIS — E11.51 TYPE II DIABETES MELLITUS WITH PERIPHERAL CIRCULATORY DISORDER (HCC): ICD-10-CM

## 2019-11-05 DIAGNOSIS — R26.2 DIFFICULTY WALKING: ICD-10-CM

## 2019-11-05 DIAGNOSIS — M79.674 PAIN OF TOE OF RIGHT FOOT: ICD-10-CM

## 2019-11-05 DIAGNOSIS — I73.9 PVD (PERIPHERAL VASCULAR DISEASE) (HCC): ICD-10-CM

## 2019-11-05 DIAGNOSIS — M79.675 PAIN OF TOE OF LEFT FOOT: ICD-10-CM

## 2019-11-05 PROCEDURE — 11721 DEBRIDE NAIL 6 OR MORE: CPT | Performed by: PODIATRIST

## 2019-11-07 ENCOUNTER — TELEPHONE (OUTPATIENT)
Dept: PRIMARY CARE CLINIC | Age: 84
End: 2019-11-07

## 2019-11-11 ENCOUNTER — HOSPITAL ENCOUNTER (OUTPATIENT)
Age: 84
Discharge: HOME | End: 2019-11-13
Payer: MEDICARE

## 2019-11-11 DIAGNOSIS — E04.2 NONTOXIC MULTINODULAR GOITER: ICD-10-CM

## 2019-11-11 DIAGNOSIS — M35.3 POLYMYALGIA RHEUMATICA (HCC): ICD-10-CM

## 2019-11-11 DIAGNOSIS — E11.59 TYPE 2 DIABETES MELLITUS WITH OTHER CIRCULATORY COMPLICATION, WITH LONG-TERM CURRENT USE OF INSULIN (HCC): ICD-10-CM

## 2019-11-11 DIAGNOSIS — E78.2 MIXED HYPERLIPIDEMIA: ICD-10-CM

## 2019-11-11 DIAGNOSIS — I10 ESSENTIAL HYPERTENSION: ICD-10-CM

## 2019-11-11 DIAGNOSIS — E53.8 VITAMIN B12 DEFICIENCY: ICD-10-CM

## 2019-11-11 DIAGNOSIS — Z79.4 TYPE 2 DIABETES MELLITUS WITH OTHER CIRCULATORY COMPLICATION, WITH LONG-TERM CURRENT USE OF INSULIN (HCC): ICD-10-CM

## 2019-11-11 DIAGNOSIS — E55.9 VITAMIN D DEFICIENCY: ICD-10-CM

## 2019-11-11 PROCEDURE — 82044 UR ALBUMIN SEMIQUANTITATIVE: CPT

## 2019-11-11 PROCEDURE — 81001 URINALYSIS AUTO W/SCOPE: CPT

## 2019-11-11 PROCEDURE — 82570 ASSAY OF URINE CREATININE: CPT

## 2019-11-12 LAB
AMORPHOUS: ABNORMAL
BACTERIA: ABNORMAL /HPF
BILIRUBIN URINE: NEGATIVE
BLOOD, URINE: NEGATIVE
CLARITY: ABNORMAL
COLOR: YELLOW
CREATININE URINE: 59 MG/DL (ref 29–226)
EPITHELIAL CELLS, UA: ABNORMAL /HPF
GLUCOSE URINE: 100 MG/DL
KETONES, URINE: NEGATIVE MG/DL
LEUKOCYTE ESTERASE, URINE: NEGATIVE
MICROALBUMIN UR-MCNC: <12 MG/L
MICROALBUMIN/CREAT UR-RTO: ABNORMAL (ref 0–30)
NITRITE, URINE: NEGATIVE
PH UA: 7.5 (ref 5–9)
PROTEIN UA: NEGATIVE MG/DL
RBC UA: ABNORMAL /HPF (ref 0–2)
SPECIFIC GRAVITY UA: 1.01 (ref 1–1.03)
UROBILINOGEN, URINE: 0.2 E.U./DL
WBC UA: ABNORMAL /HPF (ref 0–5)

## 2019-11-13 ENCOUNTER — HOSPITAL ENCOUNTER (OUTPATIENT)
Age: 84
Discharge: HOME | End: 2019-11-15
Payer: MEDICARE

## 2019-11-13 ENCOUNTER — OFFICE VISIT (OUTPATIENT)
Dept: PRIMARY CARE CLINIC | Age: 84
End: 2019-11-13
Payer: MEDICARE

## 2019-11-13 VITALS
TEMPERATURE: 97.6 F | HEIGHT: 64 IN | WEIGHT: 152 LBS | SYSTOLIC BLOOD PRESSURE: 136 MMHG | HEART RATE: 69 BPM | OXYGEN SATURATION: 96 % | BODY MASS INDEX: 25.95 KG/M2 | DIASTOLIC BLOOD PRESSURE: 74 MMHG

## 2019-11-13 DIAGNOSIS — M35.3 POLYMYALGIA RHEUMATICA (HCC): ICD-10-CM

## 2019-11-13 DIAGNOSIS — I10 ESSENTIAL HYPERTENSION: ICD-10-CM

## 2019-11-13 DIAGNOSIS — I73.9 PVD (PERIPHERAL VASCULAR DISEASE) (HCC): ICD-10-CM

## 2019-11-13 DIAGNOSIS — R26.2 DIFFICULTY WALKING: ICD-10-CM

## 2019-11-13 DIAGNOSIS — E53.8 VITAMIN B12 DEFICIENCY: ICD-10-CM

## 2019-11-13 DIAGNOSIS — N18.30 CKD (CHRONIC KIDNEY DISEASE) STAGE 3, GFR 30-59 ML/MIN (HCC): ICD-10-CM

## 2019-11-13 DIAGNOSIS — E55.9 VITAMIN D DEFICIENCY: ICD-10-CM

## 2019-11-13 DIAGNOSIS — L57.0 ACTINIC KERATOSIS: ICD-10-CM

## 2019-11-13 DIAGNOSIS — E78.2 MIXED HYPERLIPIDEMIA: ICD-10-CM

## 2019-11-13 DIAGNOSIS — E11.59 TYPE 2 DIABETES MELLITUS WITH OTHER CIRCULATORY COMPLICATION, WITH LONG-TERM CURRENT USE OF INSULIN (HCC): ICD-10-CM

## 2019-11-13 DIAGNOSIS — Z23 ENCOUNTER FOR IMMUNIZATION: Primary | ICD-10-CM

## 2019-11-13 DIAGNOSIS — M21.371 FOOT DROP, RIGHT FOOT: ICD-10-CM

## 2019-11-13 DIAGNOSIS — G89.4 CHRONIC PAIN SYNDROME: ICD-10-CM

## 2019-11-13 DIAGNOSIS — M50.90 CERVICAL DISC DISEASE: ICD-10-CM

## 2019-11-13 DIAGNOSIS — Z79.4 TYPE 2 DIABETES MELLITUS WITH OTHER CIRCULATORY COMPLICATION, WITH LONG-TERM CURRENT USE OF INSULIN (HCC): ICD-10-CM

## 2019-11-13 DIAGNOSIS — M19.90 CHRONIC OSTEOARTHRITIS: ICD-10-CM

## 2019-11-13 DIAGNOSIS — B35.1 ONYCHOMYCOSIS: ICD-10-CM

## 2019-11-13 PROBLEM — E11.9 DIABETES MELLITUS (HCC): Status: ACTIVE | Noted: 2019-11-13

## 2019-11-13 LAB
BASOPHILS ABSOLUTE: 0.03 E9/L (ref 0–0.2)
BASOPHILS RELATIVE PERCENT: 0.4 % (ref 0–2)
EOSINOPHILS ABSOLUTE: 0.08 E9/L (ref 0.05–0.5)
EOSINOPHILS RELATIVE PERCENT: 0.9 % (ref 0–6)
HBA1C MFR BLD: 7.3 % (ref 4–5.6)
HCT VFR BLD CALC: 44.7 % (ref 34–48)
HEMOGLOBIN: 14.9 G/DL (ref 11.5–15.5)
IMMATURE GRANULOCYTES #: 0.05 E9/L
IMMATURE GRANULOCYTES %: 0.6 % (ref 0–5)
LYMPHOCYTES ABSOLUTE: 2.08 E9/L (ref 1.5–4)
LYMPHOCYTES RELATIVE PERCENT: 24.4 % (ref 20–42)
MCH RBC QN AUTO: 29 PG (ref 26–35)
MCHC RBC AUTO-ENTMCNC: 33.3 % (ref 32–34.5)
MCV RBC AUTO: 87 FL (ref 80–99.9)
MONOCYTES ABSOLUTE: 0.83 E9/L (ref 0.1–0.95)
MONOCYTES RELATIVE PERCENT: 9.7 % (ref 2–12)
NEUTROPHILS ABSOLUTE: 5.47 E9/L (ref 1.8–7.3)
NEUTROPHILS RELATIVE PERCENT: 64 % (ref 43–80)
PDW BLD-RTO: 13 FL (ref 11.5–15)
PLATELET # BLD: 206 E9/L (ref 130–450)
PMV BLD AUTO: 11.2 FL (ref 7–12)
RBC # BLD: 5.14 E12/L (ref 3.5–5.5)
WBC # BLD: 8.5 E9/L (ref 4.5–11.5)

## 2019-11-13 PROCEDURE — G8482 FLU IMMUNIZE ORDER/ADMIN: HCPCS | Performed by: FAMILY MEDICINE

## 2019-11-13 PROCEDURE — G0008 ADMIN INFLUENZA VIRUS VAC: HCPCS | Performed by: FAMILY MEDICINE

## 2019-11-13 PROCEDURE — 82306 VITAMIN D 25 HYDROXY: CPT

## 2019-11-13 PROCEDURE — 84443 ASSAY THYROID STIM HORMONE: CPT

## 2019-11-13 PROCEDURE — 90653 IIV ADJUVANT VACCINE IM: CPT | Performed by: FAMILY MEDICINE

## 2019-11-13 PROCEDURE — 83036 HEMOGLOBIN GLYCOSYLATED A1C: CPT

## 2019-11-13 PROCEDURE — 85025 COMPLETE CBC W/AUTO DIFF WBC: CPT

## 2019-11-13 PROCEDURE — 99215 OFFICE O/P EST HI 40 MIN: CPT | Performed by: FAMILY MEDICINE

## 2019-11-13 PROCEDURE — 36415 COLL VENOUS BLD VENIPUNCTURE: CPT

## 2019-11-13 PROCEDURE — 1036F TOBACCO NON-USER: CPT | Performed by: FAMILY MEDICINE

## 2019-11-13 PROCEDURE — 84460 ALANINE AMINO (ALT) (SGPT): CPT

## 2019-11-13 PROCEDURE — 1090F PRES/ABSN URINE INCON ASSESS: CPT | Performed by: FAMILY MEDICINE

## 2019-11-13 PROCEDURE — 4040F PNEUMOC VAC/ADMIN/RCVD: CPT | Performed by: FAMILY MEDICINE

## 2019-11-13 PROCEDURE — G8427 DOCREV CUR MEDS BY ELIG CLIN: HCPCS | Performed by: FAMILY MEDICINE

## 2019-11-13 PROCEDURE — 80048 BASIC METABOLIC PNL TOTAL CA: CPT

## 2019-11-13 PROCEDURE — 84450 TRANSFERASE (AST) (SGOT): CPT

## 2019-11-13 PROCEDURE — 82607 VITAMIN B-12: CPT

## 2019-11-13 PROCEDURE — 1123F ACP DISCUSS/DSCN MKR DOCD: CPT | Performed by: FAMILY MEDICINE

## 2019-11-13 PROCEDURE — G8417 CALC BMI ABV UP PARAM F/U: HCPCS | Performed by: FAMILY MEDICINE

## 2019-11-13 PROCEDURE — 84439 ASSAY OF FREE THYROXINE: CPT

## 2019-11-13 PROCEDURE — 80061 LIPID PANEL: CPT

## 2019-11-13 RX ORDER — PEN NEEDLE, DIABETIC 32GX 5/32"
NEEDLE, DISPOSABLE MISCELLANEOUS
COMMUNITY
Start: 2019-11-06

## 2019-11-13 RX ORDER — METOPROLOL SUCCINATE 50 MG/1
50 TABLET, EXTENDED RELEASE ORAL DAILY
Qty: 90 TABLET | Refills: 1 | Status: SHIPPED
Start: 2019-11-13 | End: 2020-05-05 | Stop reason: SDUPTHER

## 2019-11-13 RX ORDER — ROSUVASTATIN CALCIUM 20 MG/1
20 TABLET, COATED ORAL DAILY
Qty: 90 TABLET | Refills: 1 | Status: SHIPPED
Start: 2019-11-13 | End: 2020-05-05 | Stop reason: SDUPTHER

## 2019-11-13 RX ORDER — METOPROLOL TARTRATE 50 MG/1
25 TABLET, FILM COATED ORAL
COMMUNITY
End: 2019-11-13

## 2019-11-13 RX ORDER — POTASSIUM CHLORIDE 750 MG/1
CAPSULE, EXTENDED RELEASE ORAL
Qty: 270 CAPSULE | Refills: 3 | Status: SHIPPED
Start: 2019-11-13 | End: 2020-05-05 | Stop reason: SDUPTHER

## 2019-11-13 RX ORDER — MULTIVITAMIN/IRON/FOLIC ACID 18MG-0.4MG
TABLET ORAL
COMMUNITY

## 2019-11-13 RX ORDER — HYDROCHLOROTHIAZIDE 12.5 MG/1
12.5 TABLET ORAL DAILY
Qty: 90 TABLET | Refills: 1 | Status: SHIPPED
Start: 2019-11-13 | End: 2020-05-05 | Stop reason: SDUPTHER

## 2019-11-13 RX ORDER — DULOXETIN HYDROCHLORIDE 30 MG/1
CAPSULE, DELAYED RELEASE ORAL
Refills: 0 | COMMUNITY
Start: 2019-11-07 | End: 2020-05-29

## 2019-11-13 RX ORDER — PREDNISONE 1 MG/1
2.5 TABLET ORAL
Refills: 0 | COMMUNITY
Start: 2019-11-07

## 2019-11-13 RX ORDER — LISINOPRIL 20 MG/1
20 TABLET ORAL DAILY
Qty: 90 TABLET | Refills: 1 | Status: SHIPPED
Start: 2019-11-13 | End: 2020-05-05 | Stop reason: SDUPTHER

## 2019-11-13 RX ORDER — AMLODIPINE BESYLATE 5 MG/1
5 TABLET ORAL 2 TIMES DAILY
Qty: 180 TABLET | Refills: 1 | Status: SHIPPED
Start: 2019-11-13 | End: 2020-05-05 | Stop reason: SDUPTHER

## 2019-11-13 ASSESSMENT — ENCOUNTER SYMPTOMS
EYE PAIN: 0
SINUS PRESSURE: 0
SHORTNESS OF BREATH: 0
DIARRHEA: 0
COLOR CHANGE: 0
EYE ITCHING: 0
CHEST TIGHTNESS: 0
COUGH: 0
NAUSEA: 0
VOMITING: 0
ABDOMINAL PAIN: 0
BACK PAIN: 1
WHEEZING: 0
SORE THROAT: 0
EYE REDNESS: 0
ABDOMINAL DISTENTION: 0
BLOOD IN STOOL: 0
PHOTOPHOBIA: 0
SINUS PAIN: 0
CONSTIPATION: 0

## 2019-11-14 LAB
ALT SERPL-CCNC: 16 U/L (ref 0–32)
ANION GAP SERPL CALCULATED.3IONS-SCNC: 20 MMOL/L (ref 7–16)
AST SERPL-CCNC: 23 U/L (ref 0–31)
BUN BLDV-MCNC: 16 MG/DL (ref 8–23)
CALCIUM SERPL-MCNC: 10 MG/DL (ref 8.6–10.2)
CHLORIDE BLD-SCNC: 97 MMOL/L (ref 98–107)
CHOLESTEROL, TOTAL: 153 MG/DL (ref 0–199)
CO2: 22 MMOL/L (ref 22–29)
CREAT SERPL-MCNC: 0.9 MG/DL (ref 0.5–1)
GFR AFRICAN AMERICAN: >60
GFR NON-AFRICAN AMERICAN: 59 ML/MIN/1.73
GLUCOSE BLD-MCNC: 125 MG/DL (ref 74–99)
HDLC SERPL-MCNC: 55 MG/DL
LDL CHOLESTEROL CALCULATED: 66 MG/DL (ref 0–99)
POTASSIUM SERPL-SCNC: 3.5 MMOL/L (ref 3.5–5)
SODIUM BLD-SCNC: 139 MMOL/L (ref 132–146)
T4 FREE: 1.31 NG/DL (ref 0.93–1.7)
TRIGL SERPL-MCNC: 158 MG/DL (ref 0–149)
TSH SERPL DL<=0.05 MIU/L-ACNC: 2.13 UIU/ML (ref 0.27–4.2)
VITAMIN B-12: 506 PG/ML (ref 211–946)
VITAMIN D 25-HYDROXY: 34 NG/ML (ref 30–100)
VLDLC SERPL CALC-MCNC: 32 MG/DL

## 2019-11-21 ENCOUNTER — OFFICE VISIT (OUTPATIENT)
Dept: PAIN MANAGEMENT | Age: 84
End: 2019-11-21
Payer: MEDICARE

## 2019-11-21 VITALS
WEIGHT: 154 LBS | DIASTOLIC BLOOD PRESSURE: 78 MMHG | HEART RATE: 78 BPM | TEMPERATURE: 98.6 F | OXYGEN SATURATION: 98 % | BODY MASS INDEX: 26.29 KG/M2 | SYSTOLIC BLOOD PRESSURE: 128 MMHG | RESPIRATION RATE: 16 BRPM | HEIGHT: 64 IN

## 2019-11-21 DIAGNOSIS — G89.29 CHRONIC RIGHT-SIDED LOW BACK PAIN WITH RIGHT-SIDED SCIATICA: ICD-10-CM

## 2019-11-21 DIAGNOSIS — G89.29 CHRONIC RIGHT SHOULDER PAIN: ICD-10-CM

## 2019-11-21 DIAGNOSIS — M54.2 CERVICALGIA: Primary | ICD-10-CM

## 2019-11-21 DIAGNOSIS — M54.41 CHRONIC RIGHT-SIDED LOW BACK PAIN WITH RIGHT-SIDED SCIATICA: ICD-10-CM

## 2019-11-21 DIAGNOSIS — M25.511 CHRONIC RIGHT SHOULDER PAIN: ICD-10-CM

## 2019-11-21 PROBLEM — G89.4 CHRONIC PAIN SYNDROME: Chronic | Status: ACTIVE | Noted: 2019-11-13

## 2019-11-21 PROBLEM — M79.621 PAIN OF RIGHT UPPER ARM: Chronic | Status: ACTIVE | Noted: 2017-05-10

## 2019-11-21 PROCEDURE — 4040F PNEUMOC VAC/ADMIN/RCVD: CPT | Performed by: PHYSICIAN ASSISTANT

## 2019-11-21 PROCEDURE — 1123F ACP DISCUSS/DSCN MKR DOCD: CPT | Performed by: PHYSICIAN ASSISTANT

## 2019-11-21 PROCEDURE — G8427 DOCREV CUR MEDS BY ELIG CLIN: HCPCS | Performed by: PHYSICIAN ASSISTANT

## 2019-11-21 PROCEDURE — 1036F TOBACCO NON-USER: CPT | Performed by: PHYSICIAN ASSISTANT

## 2019-11-21 PROCEDURE — 99204 OFFICE O/P NEW MOD 45 MIN: CPT | Performed by: PHYSICIAN ASSISTANT

## 2019-11-21 PROCEDURE — G8482 FLU IMMUNIZE ORDER/ADMIN: HCPCS | Performed by: PHYSICIAN ASSISTANT

## 2019-11-21 PROCEDURE — 1090F PRES/ABSN URINE INCON ASSESS: CPT | Performed by: PHYSICIAN ASSISTANT

## 2019-11-21 PROCEDURE — G8417 CALC BMI ABV UP PARAM F/U: HCPCS | Performed by: PHYSICIAN ASSISTANT

## 2019-11-22 ENCOUNTER — TELEPHONE (OUTPATIENT)
Dept: PAIN MANAGEMENT | Age: 84
End: 2019-11-22

## 2019-11-25 ENCOUNTER — HOSPITAL ENCOUNTER (OUTPATIENT)
Age: 84
Discharge: HOME | End: 2019-11-27
Payer: MEDICARE

## 2019-11-25 ENCOUNTER — PROCEDURE VISIT (OUTPATIENT)
Dept: PAIN MANAGEMENT | Age: 84
End: 2019-11-25
Payer: MEDICARE

## 2019-11-25 ENCOUNTER — HOSPITAL ENCOUNTER (OUTPATIENT)
Dept: GENERAL RADIOLOGY | Age: 84
Discharge: HOME | End: 2019-11-27
Payer: MEDICARE

## 2019-11-25 VITALS
HEIGHT: 64 IN | BODY MASS INDEX: 26.29 KG/M2 | OXYGEN SATURATION: 98 % | WEIGHT: 154 LBS | TEMPERATURE: 97.6 F | SYSTOLIC BLOOD PRESSURE: 142 MMHG | RESPIRATION RATE: 18 BRPM | DIASTOLIC BLOOD PRESSURE: 60 MMHG | HEART RATE: 60 BPM

## 2019-11-25 DIAGNOSIS — M54.2 CERVICALGIA: ICD-10-CM

## 2019-11-25 DIAGNOSIS — G89.29 CHRONIC MYOFASCIAL PAIN: Primary | ICD-10-CM

## 2019-11-25 DIAGNOSIS — M79.18 CHRONIC MYOFASCIAL PAIN: Primary | ICD-10-CM

## 2019-11-25 PROCEDURE — 76942 ECHO GUIDE FOR BIOPSY: CPT | Performed by: ANESTHESIOLOGY

## 2019-11-25 PROCEDURE — 20553 NJX 1/MLT TRIGGER POINTS 3/>: CPT | Performed by: ANESTHESIOLOGY

## 2019-11-25 PROCEDURE — 72040 X-RAY EXAM NECK SPINE 2-3 VW: CPT

## 2019-11-25 RX ORDER — TRIAMCINOLONE ACETONIDE 40 MG/ML
40 INJECTION, SUSPENSION INTRA-ARTICULAR; INTRAMUSCULAR ONCE
Status: COMPLETED | OUTPATIENT
Start: 2019-11-25 | End: 2019-11-25

## 2019-11-25 RX ORDER — BUPIVACAINE HYDROCHLORIDE 2.5 MG/ML
10 INJECTION, SOLUTION EPIDURAL; INFILTRATION; INTRACAUDAL ONCE
Status: COMPLETED | OUTPATIENT
Start: 2019-11-25 | End: 2019-11-25

## 2019-11-25 RX ADMIN — BUPIVACAINE HYDROCHLORIDE 10 ML: 2.5 INJECTION, SOLUTION EPIDURAL; INFILTRATION; INTRACAUDAL at 14:40

## 2019-11-25 RX ADMIN — TRIAMCINOLONE ACETONIDE 40 MG: 40 INJECTION, SUSPENSION INTRA-ARTICULAR; INTRAMUSCULAR at 14:41

## 2019-11-27 DIAGNOSIS — F41.9 ANXIETY: ICD-10-CM

## 2019-11-27 RX ORDER — ALPRAZOLAM 0.25 MG/1
0.25 TABLET ORAL NIGHTLY PRN
Qty: 30 TABLET | Refills: 0 | Status: SHIPPED | OUTPATIENT
Start: 2019-11-27 | End: 2020-01-22 | Stop reason: SDUPTHER

## 2020-01-22 RX ORDER — ALPRAZOLAM 0.25 MG/1
0.25 TABLET ORAL NIGHTLY PRN
Qty: 30 TABLET | Refills: 2 | Status: SHIPPED | OUTPATIENT
Start: 2020-01-22 | End: 2020-02-21

## 2020-05-05 RX ORDER — METOPROLOL SUCCINATE 50 MG/1
50 TABLET, EXTENDED RELEASE ORAL DAILY
Qty: 90 TABLET | Refills: 1 | Status: SHIPPED | OUTPATIENT
Start: 2020-05-05

## 2020-05-05 RX ORDER — POTASSIUM CHLORIDE 750 MG/1
CAPSULE, EXTENDED RELEASE ORAL
Qty: 270 CAPSULE | Refills: 3 | Status: SHIPPED | OUTPATIENT
Start: 2020-05-05

## 2020-05-05 RX ORDER — AMLODIPINE BESYLATE 5 MG/1
5 TABLET ORAL 2 TIMES DAILY
Qty: 180 TABLET | Refills: 1 | Status: ON HOLD
Start: 2020-05-05 | End: 2022-02-18 | Stop reason: HOSPADM

## 2020-05-05 RX ORDER — ROSUVASTATIN CALCIUM 20 MG/1
20 TABLET, COATED ORAL DAILY
Qty: 90 TABLET | Refills: 1 | Status: SHIPPED | OUTPATIENT
Start: 2020-05-05

## 2020-05-05 RX ORDER — HYDROCHLOROTHIAZIDE 12.5 MG/1
12.5 TABLET ORAL DAILY
Qty: 90 TABLET | Refills: 1 | Status: ON HOLD
Start: 2020-05-05 | End: 2022-02-18 | Stop reason: HOSPADM

## 2020-05-05 RX ORDER — LISINOPRIL 20 MG/1
20 TABLET ORAL DAILY
Qty: 90 TABLET | Refills: 1 | Status: SHIPPED | OUTPATIENT
Start: 2020-05-05

## 2020-05-06 RX ORDER — ALPRAZOLAM 0.25 MG/1
TABLET ORAL
COMMUNITY
Start: 2020-03-20 | End: 2020-07-09 | Stop reason: SDUPTHER

## 2020-05-18 ENCOUNTER — HOSPITAL ENCOUNTER (OUTPATIENT)
Age: 85
Discharge: HOME | End: 2020-05-20
Payer: MEDICARE

## 2020-05-18 LAB
FERRITIN: 165 NG/ML
IRON: 72 MCG/DL (ref 37–145)

## 2020-05-18 PROCEDURE — 36415 COLL VENOUS BLD VENIPUNCTURE: CPT

## 2020-05-18 PROCEDURE — 83540 ASSAY OF IRON: CPT

## 2020-05-18 PROCEDURE — 82728 ASSAY OF FERRITIN: CPT

## 2020-05-20 ENCOUNTER — TELEPHONE (OUTPATIENT)
Dept: PRIMARY CARE CLINIC | Age: 85
End: 2020-05-20

## 2020-05-20 NOTE — TELEPHONE ENCOUNTER
[facility] called, pt needs a new order for a PTH and also recollection. Serum is required for testing.

## 2020-05-22 ENCOUNTER — HOSPITAL ENCOUNTER (OUTPATIENT)
Age: 85
Discharge: HOME | End: 2020-05-24
Payer: MEDICARE

## 2020-05-22 LAB
HBA1C MFR BLD: 7.7 % (ref 4–5.6)
PARATHYROID HORMONE INTACT: 60 PG/ML (ref 15–65)

## 2020-05-22 PROCEDURE — 36415 COLL VENOUS BLD VENIPUNCTURE: CPT

## 2020-05-22 PROCEDURE — 83970 ASSAY OF PARATHORMONE: CPT

## 2020-05-22 PROCEDURE — 83036 HEMOGLOBIN GLYCOSYLATED A1C: CPT

## 2020-05-26 ENCOUNTER — PROCEDURE VISIT (OUTPATIENT)
Dept: PODIATRY | Age: 85
End: 2020-05-26
Payer: MEDICARE

## 2020-05-26 PROCEDURE — 11721 DEBRIDE NAIL 6 OR MORE: CPT | Performed by: PODIATRIST

## 2020-05-26 NOTE — PROGRESS NOTES
20     Jovan Phan    : 1928  Sex: female  Age: 80 y.o. Subjective: The patient is seen today for evaluation regarding diabetic foot evaluation and mycotic nail care. No other complaints noted. Chief Complaint   Patient presents with    Nail Problem     nail care       Current Medications:    Current Outpatient Medications:     ALPRAZolam (XANAX) 0.25 MG tablet, TAKE 1 TABLET BY MOUTH NIGHTLY AS NEEDED FOR SLEEP FOR UP TO 30 DAYS., Disp: , Rfl:     amLODIPine (NORVASC) 5 MG tablet, Take 1 tablet by mouth 2 times daily, Disp: 180 tablet, Rfl: 1    hydrochlorothiazide (HYDRODIURIL) 12.5 MG tablet, Take 1 tablet by mouth daily, Disp: 90 tablet, Rfl: 1    lisinopril (PRINIVIL;ZESTRIL) 20 MG tablet, Take 1 tablet by mouth daily, Disp: 90 tablet, Rfl: 1    metoprolol succinate (TOPROL XL) 50 MG extended release tablet, Take 1 tablet by mouth daily, Disp: 90 tablet, Rfl: 1    potassium chloride (MICRO-K) 10 MEQ extended release capsule, 3 po daily, Disp: 270 capsule, Rfl: 3    rosuvastatin (CRESTOR) 20 MG tablet, Take 1 tablet by mouth daily, Disp: 90 tablet, Rfl: 1    predniSONE (DELTASONE) 5 MG tablet, TAKE 1 TABLET BY MOUTH ONCE DAILY, Disp: , Rfl: 0    DULoxetine (CYMBALTA) 30 MG extended release capsule, take 1 capsule by mouth once daily, Disp: , Rfl: 0    BD PEN NEEDLE RISA U/F 32G X 4 MM MISC, , Disp: , Rfl:     Glucosamine-Chondroitin 1966-2661 MG/30ML LIQD, Take by mouth, Disp: , Rfl:     insulin lispro (HUMALOG KWIKPEN) 200 UNIT/ML SOPN pen, 14units q am, Disp: 3 pen, Rfl: 1    insulin glargine (BASAGLAR KWIKPEN) 100 UNIT/ML injection pen, Inject 20 Units into the skin nightly, Disp: 5 pen, Rfl: 1    diclofenac sodium 1 % GEL, Apply pea size to right shoulder three times per day as needd, Disp: , Rfl:     Probiotic Product (VSL#3 PO), Take 1 tablet by mouth 2 times daily, Disp: , Rfl:     Misc.  Devices (QUAD CANE) MISC, by Does not apply route, Disp: , Rfl:   

## 2020-05-26 NOTE — PROGRESS NOTES
Patient in today for nail care. Patient does not have any complaints of pain at this time.  Patient's PCP is Matthew Rosado MD date of last ov    11-13-19     Gino Deluna LPN

## 2020-05-29 ENCOUNTER — OFFICE VISIT (OUTPATIENT)
Dept: PRIMARY CARE CLINIC | Age: 85
End: 2020-05-29
Payer: MEDICARE

## 2020-05-29 VITALS
SYSTOLIC BLOOD PRESSURE: 132 MMHG | HEART RATE: 66 BPM | DIASTOLIC BLOOD PRESSURE: 80 MMHG | BODY MASS INDEX: 27.61 KG/M2 | TEMPERATURE: 98.7 F | OXYGEN SATURATION: 95 % | HEIGHT: 63 IN | WEIGHT: 155.8 LBS

## 2020-05-29 PROCEDURE — 3051F HG A1C>EQUAL 7.0%<8.0%: CPT | Performed by: FAMILY MEDICINE

## 2020-05-29 PROCEDURE — 99214 OFFICE O/P EST MOD 30 MIN: CPT | Performed by: FAMILY MEDICINE

## 2020-05-29 PROCEDURE — 1090F PRES/ABSN URINE INCON ASSESS: CPT | Performed by: FAMILY MEDICINE

## 2020-05-29 PROCEDURE — 1036F TOBACCO NON-USER: CPT | Performed by: FAMILY MEDICINE

## 2020-05-29 PROCEDURE — 4040F PNEUMOC VAC/ADMIN/RCVD: CPT | Performed by: FAMILY MEDICINE

## 2020-05-29 PROCEDURE — G8427 DOCREV CUR MEDS BY ELIG CLIN: HCPCS | Performed by: FAMILY MEDICINE

## 2020-05-29 PROCEDURE — G8417 CALC BMI ABV UP PARAM F/U: HCPCS | Performed by: FAMILY MEDICINE

## 2020-05-29 PROCEDURE — 1123F ACP DISCUSS/DSCN MKR DOCD: CPT | Performed by: FAMILY MEDICINE

## 2020-05-29 ASSESSMENT — PATIENT HEALTH QUESTIONNAIRE - PHQ9
SUM OF ALL RESPONSES TO PHQ9 QUESTIONS 1 & 2: 0
1. LITTLE INTEREST OR PLEASURE IN DOING THINGS: 0
SUM OF ALL RESPONSES TO PHQ QUESTIONS 1-9: 0
SUM OF ALL RESPONSES TO PHQ QUESTIONS 1-9: 0
2. FEELING DOWN, DEPRESSED OR HOPELESS: 0

## 2020-05-29 ASSESSMENT — ENCOUNTER SYMPTOMS
BLOOD IN STOOL: 0
ABDOMINAL PAIN: 0
CONSTIPATION: 0
SORE THROAT: 0
NAUSEA: 0
COLOR CHANGE: 0
CHEST TIGHTNESS: 0
WHEEZING: 0
SINUS PAIN: 0
DIARRHEA: 0
VOMITING: 0
EYE PAIN: 0
SINUS PRESSURE: 0
PHOTOPHOBIA: 0
ABDOMINAL DISTENTION: 0
EYE REDNESS: 0
EYE ITCHING: 0
BACK PAIN: 1
COUGH: 0
SHORTNESS OF BREATH: 0

## 2020-05-29 NOTE — PROGRESS NOTES
recommended vaccinations    patient declines mammogram due to age and previous complication of rib fractures. HPI: Diabetes, hyperlipidemia and hypertension. Taking medication as prescribed. No medication side effects noted. Diabetes: Diet: Somewhat compliant with diet plan. Foot Problems: Patient checks feet regularly. Following with dr. Scott Barclay due to foot drop, wearing foot brace, also h/o onychomycosis. Review of Systems   Constitutional: Negative for appetite change, fatigue, fever and unexpected weight change. HENT: Negative for congestion, ear discharge, ear pain, hearing loss, mouth sores, postnasal drip, sinus pressure, sinus pain, sneezing and sore throat. Eyes: Negative for photophobia, pain, redness and itching. Respiratory: Negative for cough, chest tightness, shortness of breath and wheezing. Cardiovascular: Negative for chest pain, palpitations and leg swelling. Htn   hyperlipidemia   Gastrointestinal: Negative for abdominal distention, abdominal pain, blood in stool, constipation, diarrhea, nausea and vomiting. Endocrine: Negative for cold intolerance and heat intolerance. Type 2 dm   Genitourinary: Negative for difficulty urinating, dysuria, frequency, hematuria and urgency. Ckd 3   Musculoskeletal: Positive for arthralgias, back pain, gait problem and myalgias. Negative for joint swelling, neck pain and neck stiffness. Right foot drop   Skin: Negative for color change, pallor and wound. Allergic/Immunologic: Negative for environmental allergies and food allergies. Neurological: Negative for dizziness, seizures, syncope, weakness, light-headedness and headaches. Hematological: Negative for adenopathy. Psychiatric/Behavioral: Negative for agitation, confusion, dysphoric mood, sleep disturbance and suicidal ideas. The patient is nervous/anxious. The patient is not hyperactive.           Current Outpatient Medications:     ALPRAZolam (XANAX) 0.25 Currently     Partners: Male     Birth control/protection: Post-menopausal   Lifestyle    Physical activity     Days per week: Not on file     Minutes per session: Not on file    Stress: Not on file   Relationships    Social connections     Talks on phone: Not on file     Gets together: Not on file     Attends Mor[employer] service: Not on file     Active member of club or organization: Not on file     Attends meetings of clubs or organizations: Not on file     Relationship status: Not on file    Intimate partner violence     Fear of current or ex partner: Not on file     Emotionally abused: Not on file     Physically abused: Not on file     Forced sexual activity: Not on file   Other Topics Concern    Not on file   Social History Narrative    Not on file       Vitals:    05/29/20 1315   BP: 132/80   Pulse: 66   Temp: 98.7 °F (37.1 °C)   TempSrc: Temporal   SpO2: 95%   Weight: 155 lb 12.8 oz (70.7 kg)   Height: 5' 3\" (1.6 m)       Exam:  Physical Exam  Constitutional:       Appearance: She is well-developed. HENT:      Head: Normocephalic and atraumatic. Right Ear: External ear normal.      Left Ear: External ear normal.      Nose: Nose normal.   Eyes:      Conjunctiva/sclera: Conjunctivae normal.      Pupils: Pupils are equal, round, and reactive to light. Neck:      Musculoskeletal: Neck supple. Comments: Pain with movement of head and neck  Cardiovascular:      Rate and Rhythm: Normal rate and regular rhythm. Heart sounds: Normal heart sounds. Pulmonary:      Effort: Pulmonary effort is normal.      Breath sounds: Normal breath sounds. Abdominal:      General: Bowel sounds are normal.      Palpations: Abdomen is soft. Musculoskeletal:         General: Tenderness present. Comments: Walks with a slow labored gait  Uses quad cane for ambulation purposes  Right frozen shoulder noted  Right foot and ankle in afo   Skin:     General: Skin is warm and dry. Findings: No rash. Comments: Numerous acitinic keratosis b/l upper ext, also several ak's noted on face, forehead and nose   Neurological:      Mental Status: She is alert and oriented to person, place, and time.       Comments: Weakness noted b/l lower ext, uses quad cane for ambulation purposes         Assessment and Plan:    Problem List        Circulatory    Hypertension - Primary    Relevant Medications    amLODIPine (NORVASC) 5 MG tablet    hydrochlorothiazide (HYDRODIURIL) 12.5 MG tablet    lisinopril (PRINIVIL;ZESTRIL) 20 MG tablet    metoprolol succinate (TOPROL XL) 50 MG extended release tablet    potassium chloride (MICRO-K) 10 MEQ extended release capsule    Type II diabetes mellitus with peripheral circulatory disorder (HCC)    Relevant Medications    Insulin Lispro Prot & Lispro (HUMALOG MIX 75/25 PEN SC)    insulin lispro (HUMALOG KWIKPEN) 200 UNIT/ML SOPN pen    insulin glargine (BASAGLAR KWIKPEN) 100 UNIT/ML injection pen    LBBB (left bundle branch block)    PVD (peripheral vascular disease) ([facility])    Relevant Orders    CBC Auto Differential       Endocrine    Nontoxic multinodular goiter    Relevant Orders    TSH without Reflex    T4, Free    Diabetes mellitus ([facility])    Relevant Medications    Insulin Lispro Prot & Lispro (HUMALOG MIX 75/25 PEN SC)    insulin lispro (HUMALOG KWIKPEN) 200 UNIT/ML SOPN pen    insulin glargine (BASAGLAR KWIKPEN) 100 UNIT/ML injection pen    Other Relevant Orders    Urinalysis    Microalbumin / Creatinine Urine Ratio       Nervous and Auditory    Chronic right-sided low back pain with right-sided sciatica    Relevant Medications    predniSONE (DELTASONE) 5 MG tablet    ALPRAZolam (XANAX) 0.25 MG tablet    Other Relevant Orders    Krystal Martinez MD, Pain Medicine, [place]       Musculoskeletal and Integument    Osteopenia    Cervical disc disease    Relevant Orders    Krystal Martinez MD, Pain Medicine, [place]    Chronic osteoarthritis    Relevant Medications predniSONE (DELTASONE) 5 MG tablet    Actinic keratosis       Genitourinary    CKD (chronic kidney disease) stage 3, GFR 30-59 ml/min (HCC)    Relevant Orders    Comprehensive Metabolic Panel    CBC Auto Differential       Other    Polymyalgia rheumatica (HCC) (Chronic)    Chronic pain syndrome (Chronic)    Relevant Orders    Alissa Khanna MD, Pain Medicine, [place]    Vitamin D deficiency    Relevant Orders    Vitamin D 25 Hydroxy    Hyperlipidemia    Relevant Medications    amLODIPine (NORVASC) 5 MG tablet    hydrochlorothiazide (HYDRODIURIL) 12.5 MG tablet    lisinopril (PRINIVIL;ZESTRIL) 20 MG tablet    metoprolol succinate (TOPROL XL) 50 MG extended release tablet    rosuvastatin (CRESTOR) 20 MG tablet    Other Relevant Orders    Lipid Panel    Anxiety    Relevant Medications    ALPRAZolam (XANAX) 0.25 MG tablet    Vitamin B12 deficiency    Relevant Orders    Vitamin B12    Folate    Foot drop, right foot    Difficulty walking      bp well controlled  Sugars worse--discussed need for approp diet, will increase basaglar to 30 units for now until she sees endo  cont to follow with rheum  Cont to follow with endo  Cont to follow with bridgettt  Cont to follow with dr. Scott Del Valle  Declines hm recommendations due to advanced age  Order placed for fasting bw--see above discussion  Discussed bw that's in chart available to review  Referral placed to back to pain management dr. Walter Hsieh    Return in about 3 months (around 8/29/2020).     Fasting bw prior  Orders Placed This Encounter   Procedures    Comprehensive Metabolic Panel     Standing Status:   Future     Standing Expiration Date:   5/29/2021    CBC Auto Differential     Standing Status:   Future     Standing Expiration Date:   5/29/2021    Lipid Panel     Standing Status:   Future     Standing Expiration Date:   5/29/2021     Order Specific Question:   Is Patient Fasting?/# of Hours     Answer:   12    Urinalysis     Standing Status:   Future

## 2020-06-02 ENCOUNTER — HOSPITAL ENCOUNTER (OUTPATIENT)
Age: 85
Discharge: HOME | End: 2020-06-04
Payer: MEDICARE

## 2020-06-02 LAB
ALBUMIN SERPL-MCNC: 4.6 G/DL (ref 3.5–5.2)
ALP BLD-CCNC: 53 U/L (ref 35–104)
ALT SERPL-CCNC: 16 U/L (ref 0–32)
ANION GAP SERPL CALCULATED.3IONS-SCNC: 21 MMOL/L (ref 7–16)
AST SERPL-CCNC: 22 U/L (ref 0–31)
BACTERIA: ABNORMAL /HPF
BASOPHILS ABSOLUTE: 0.04 E9/L (ref 0–0.2)
BASOPHILS RELATIVE PERCENT: 0.5 % (ref 0–2)
BILIRUB SERPL-MCNC: 0.8 MG/DL (ref 0–1.2)
BILIRUBIN URINE: NEGATIVE
BLOOD, URINE: ABNORMAL
BUN BLDV-MCNC: 18 MG/DL (ref 8–23)
CALCIUM SERPL-MCNC: 10.2 MG/DL (ref 8.6–10.2)
CHLORIDE BLD-SCNC: 100 MMOL/L (ref 98–107)
CHOLESTEROL, TOTAL: 150 MG/DL (ref 0–199)
CLARITY: CLEAR
CO2: 22 MMOL/L (ref 22–29)
COLOR: YELLOW
CREAT SERPL-MCNC: 0.8 MG/DL (ref 0.5–1)
CREATININE URINE: 76 MG/DL (ref 29–226)
EOSINOPHILS ABSOLUTE: 0.06 E9/L (ref 0.05–0.5)
EOSINOPHILS RELATIVE PERCENT: 0.7 % (ref 0–6)
EPITHELIAL CELLS, UA: ABNORMAL /HPF
FOLATE: >20 NG/ML (ref 4.8–24.2)
GFR AFRICAN AMERICAN: >60
GFR NON-AFRICAN AMERICAN: >60 ML/MIN/1.73
GLUCOSE BLD-MCNC: 158 MG/DL (ref 74–99)
GLUCOSE URINE: 250 MG/DL
HCT VFR BLD CALC: 44.1 % (ref 34–48)
HDLC SERPL-MCNC: 51 MG/DL
HEMOGLOBIN: 14.3 G/DL (ref 11.5–15.5)
IMMATURE GRANULOCYTES #: 0.05 E9/L
IMMATURE GRANULOCYTES %: 0.6 % (ref 0–5)
KETONES, URINE: NEGATIVE MG/DL
LDL CHOLESTEROL CALCULATED: 52 MG/DL (ref 0–99)
LEUKOCYTE ESTERASE, URINE: NEGATIVE
LYMPHOCYTES ABSOLUTE: 2.57 E9/L (ref 1.5–4)
LYMPHOCYTES RELATIVE PERCENT: 30.7 % (ref 20–42)
MCH RBC QN AUTO: 30.6 PG (ref 26–35)
MCHC RBC AUTO-ENTMCNC: 32.4 % (ref 32–34.5)
MCV RBC AUTO: 94.4 FL (ref 80–99.9)
MICROALBUMIN UR-MCNC: <12 MG/L
MICROALBUMIN/CREAT UR-RTO: ABNORMAL (ref 0–30)
MONOCYTES ABSOLUTE: 0.67 E9/L (ref 0.1–0.95)
MONOCYTES RELATIVE PERCENT: 8 % (ref 2–12)
NEUTROPHILS ABSOLUTE: 4.97 E9/L (ref 1.8–7.3)
NEUTROPHILS RELATIVE PERCENT: 59.5 % (ref 43–80)
NITRITE, URINE: NEGATIVE
PDW BLD-RTO: 12.9 FL (ref 11.5–15)
PH UA: 6 (ref 5–9)
PLATELET # BLD: 208 E9/L (ref 130–450)
PMV BLD AUTO: 10.7 FL (ref 7–12)
POTASSIUM SERPL-SCNC: 3.4 MMOL/L (ref 3.5–5)
PROTEIN UA: NEGATIVE MG/DL
RBC # BLD: 4.67 E12/L (ref 3.5–5.5)
RBC UA: ABNORMAL /HPF (ref 0–2)
SODIUM BLD-SCNC: 143 MMOL/L (ref 132–146)
SPECIFIC GRAVITY UA: 1.02 (ref 1–1.03)
T4 FREE: 1.16 NG/DL (ref 0.93–1.7)
TOTAL PROTEIN: 7 G/DL (ref 6.4–8.3)
TRIGL SERPL-MCNC: 235 MG/DL (ref 0–149)
TSH SERPL DL<=0.05 MIU/L-ACNC: 1.22 UIU/ML (ref 0.27–4.2)
UROBILINOGEN, URINE: 0.2 E.U./DL
VITAMIN B-12: 565 PG/ML (ref 211–946)
VITAMIN D 25-HYDROXY: 32 NG/ML (ref 30–100)
VLDLC SERPL CALC-MCNC: 47 MG/DL
WBC # BLD: 8.4 E9/L (ref 4.5–11.5)
WBC UA: ABNORMAL /HPF (ref 0–5)

## 2020-06-02 PROCEDURE — 82044 UR ALBUMIN SEMIQUANTITATIVE: CPT

## 2020-06-02 PROCEDURE — 80053 COMPREHEN METABOLIC PANEL: CPT

## 2020-06-02 PROCEDURE — 84439 ASSAY OF FREE THYROXINE: CPT

## 2020-06-02 PROCEDURE — 80061 LIPID PANEL: CPT

## 2020-06-02 PROCEDURE — 36415 COLL VENOUS BLD VENIPUNCTURE: CPT

## 2020-06-02 PROCEDURE — 84443 ASSAY THYROID STIM HORMONE: CPT

## 2020-06-02 PROCEDURE — 81001 URINALYSIS AUTO W/SCOPE: CPT

## 2020-06-02 PROCEDURE — 82306 VITAMIN D 25 HYDROXY: CPT

## 2020-06-02 PROCEDURE — 82570 ASSAY OF URINE CREATININE: CPT

## 2020-06-02 PROCEDURE — 82746 ASSAY OF FOLIC ACID SERUM: CPT

## 2020-06-02 PROCEDURE — 85025 COMPLETE CBC W/AUTO DIFF WBC: CPT

## 2020-06-02 PROCEDURE — 82607 VITAMIN B-12: CPT

## 2020-06-08 ENCOUNTER — HOSPITAL ENCOUNTER (OUTPATIENT)
Age: 85
Discharge: HOME | End: 2020-06-10
Payer: MEDICARE

## 2020-06-08 ENCOUNTER — OFFICE VISIT (OUTPATIENT)
Dept: PAIN MANAGEMENT | Age: 85
End: 2020-06-08
Payer: MEDICARE

## 2020-06-08 ENCOUNTER — PREP FOR PROCEDURE (OUTPATIENT)
Dept: PAIN MANAGEMENT | Age: 85
End: 2020-06-08

## 2020-06-08 ENCOUNTER — HOSPITAL ENCOUNTER (OUTPATIENT)
Dept: GENERAL RADIOLOGY | Age: 85
Discharge: HOME | End: 2020-06-10
Payer: MEDICARE

## 2020-06-08 VITALS
HEART RATE: 86 BPM | WEIGHT: 155 LBS | BODY MASS INDEX: 26.46 KG/M2 | SYSTOLIC BLOOD PRESSURE: 148 MMHG | DIASTOLIC BLOOD PRESSURE: 88 MMHG | TEMPERATURE: 97.8 F | RESPIRATION RATE: 16 BRPM | OXYGEN SATURATION: 99 % | HEIGHT: 64 IN

## 2020-06-08 PROBLEM — M53.3 SACROILIAC DYSFUNCTION: Status: ACTIVE | Noted: 2020-06-08

## 2020-06-08 PROCEDURE — 20610 DRAIN/INJ JOINT/BURSA W/O US: CPT | Performed by: ANESTHESIOLOGY

## 2020-06-08 PROCEDURE — 1036F TOBACCO NON-USER: CPT | Performed by: ANESTHESIOLOGY

## 2020-06-08 PROCEDURE — 4040F PNEUMOC VAC/ADMIN/RCVD: CPT | Performed by: ANESTHESIOLOGY

## 2020-06-08 PROCEDURE — 72202 X-RAY EXAM SI JOINTS 3/> VWS: CPT

## 2020-06-08 PROCEDURE — 1090F PRES/ABSN URINE INCON ASSESS: CPT | Performed by: ANESTHESIOLOGY

## 2020-06-08 PROCEDURE — 1123F ACP DISCUSS/DSCN MKR DOCD: CPT | Performed by: ANESTHESIOLOGY

## 2020-06-08 PROCEDURE — 99215 OFFICE O/P EST HI 40 MIN: CPT | Performed by: ANESTHESIOLOGY

## 2020-06-08 PROCEDURE — 20552 NJX 1/MLT TRIGGER POINT 1/2: CPT | Performed by: ANESTHESIOLOGY

## 2020-06-08 PROCEDURE — 72110 X-RAY EXAM L-2 SPINE 4/>VWS: CPT

## 2020-06-08 PROCEDURE — G8427 DOCREV CUR MEDS BY ELIG CLIN: HCPCS | Performed by: ANESTHESIOLOGY

## 2020-06-08 PROCEDURE — G8417 CALC BMI ABV UP PARAM F/U: HCPCS | Performed by: ANESTHESIOLOGY

## 2020-06-08 PROCEDURE — 73030 X-RAY EXAM OF SHOULDER: CPT

## 2020-06-08 RX ORDER — BUPIVACAINE HYDROCHLORIDE 2.5 MG/ML
3 INJECTION, SOLUTION EPIDURAL; INFILTRATION; INTRACAUDAL ONCE
Status: COMPLETED | OUTPATIENT
Start: 2020-06-08 | End: 2020-06-08

## 2020-06-08 RX ORDER — BUPIVACAINE HYDROCHLORIDE 2.5 MG/ML
6 INJECTION, SOLUTION EPIDURAL; INFILTRATION; INTRACAUDAL ONCE
Status: COMPLETED | OUTPATIENT
Start: 2020-06-08 | End: 2020-06-08

## 2020-06-08 RX ORDER — TRIAMCINOLONE ACETONIDE 40 MG/ML
40 INJECTION, SUSPENSION INTRA-ARTICULAR; INTRAMUSCULAR ONCE
Status: COMPLETED | OUTPATIENT
Start: 2020-06-08 | End: 2020-06-08

## 2020-06-08 RX ORDER — SENNOSIDES 8.6 MG
650 CAPSULE ORAL 3 TIMES DAILY
COMMUNITY

## 2020-06-08 RX ADMIN — BUPIVACAINE HYDROCHLORIDE 3 ML: 2.5 INJECTION, SOLUTION EPIDURAL; INFILTRATION; INTRACAUDAL at 16:06

## 2020-06-08 RX ADMIN — BUPIVACAINE HYDROCHLORIDE 6 ML: 2.5 INJECTION, SOLUTION EPIDURAL; INFILTRATION; INTRACAUDAL at 16:13

## 2020-06-08 RX ADMIN — TRIAMCINOLONE ACETONIDE 40 MG: 40 INJECTION, SUSPENSION INTRA-ARTICULAR; INTRAMUSCULAR at 16:12

## 2020-06-08 NOTE — PROGRESS NOTES
[facility]   hydrochlorothiazide (HYDRODIURIL) 12.5 MG tablet Take 1 tablet by mouth daily 5/5/20  Yes [facility], APRN - CNP   lisinopril (PRINIVIL;ZESTRIL) 20 MG tablet Take 1 tablet by mouth daily 5/5/20  Yes [facility], APRN - CNP   metoprolol succinate (TOPROL XL) 50 MG extended release tablet Take 1 tablet by mouth daily 5/5/20  Yes [facility], APRN - CNP   potassium chloride (MICRO-K) 10 MEQ extended release capsule 3 po daily 5/5/20  [facility]   rosuvastatin (CRESTOR) 20 MG tablet Take 1 tablet by mouth daily 5/5/20  Yes [facility], APRN - CNP   predniSONE (DELTASONE) 5 MG tablet TAKE 1 TABLET BY MOUTH ONCE DAILY 11/7/19  Yes Historical Provider, MD   BD PEN NEEDLE RISA U/F 32G X 4 MM MISC  11/6/19  Yes Historical Provider, MD   Glucosamine-Chondroitin 0441-3070 MG/30ML LIQD Take by mouth   Yes Historical Provider, MD   insulin lispro (HUMALOG KWIKPEN) 200 UNIT/ML SOPN pen 14units q am 5/15/19  Yes Justina Blanchard MD   insulin glargine (BASAGLAR KWIKPEN) 100 UNIT/ML injection pen Inject 20 Units into the skin nightly 5/15/19  Yes Justina Blanchard MD   diclofenac sodium 1 % GEL Apply pea size to right shoulder three times per day as needd   Yes Historical Provider, MD   Probiotic Product (VSL#3 PO) Take 1 tablet by mouth 2 times daily   Yes Historical Provider, MD   vitamin D 1000 UNITS CAPS Take 1 capsule by mouth 2 times daily   Yes Historical Provider, MD   Coenzyme Q10 (CO Q 10) 10 MG CAPS Take by mouth   Yes Historical Provider, MD   Calcium Carbonate-Vit D-Min (CALCIUM 1200 PO) Take by mouth   Yes Historical Provider, MD       Allergies   Allergen Reactions    Iv Dye [Iodides] Hives    Pcn [Penicillins] Hives    Relafen [Nabumetone] Hives    Glucophage [Metformin Hcl] Diarrhea    Niaspan [Niacin Er]      Night sweats       Social History     Socioeconomic History    Marital status:      Spouse name: Not on file    Number of children: Not on file    Years of education: Not on file    Highest education level: Not on file   Occupational History    Not on file   Social Needs    Financial resource strain: Not on file    Food insecurity     Worry: Not on file     Inability: Not on file    Transportation needs     Medical: Not on file     Non-medical: Not on file   Tobacco Use    Smoking status: Never Smoker    Smokeless tobacco: Never Used   Substance and Sexual Activity    Alcohol use: Yes     Comment: rarely    Drug use: Never    Sexual activity: Not on file   Lifestyle    Physical activity     Days per week: Not on file     Minutes per session: Not on file    Stress: Not on file   Relationships    Social connections     Talks on phone: Not on file     Gets together: Not on file     Attends Lutheran service: Not on file     Active member of club or organization: Not on file     Attends meetings of clubs or organizations: Not on file     Relationship status: Not on file    Intimate partner violence     Fear of current or ex partner: Not on file     Emotionally abused: Not on file     Physically abused: Not on file     Forced sexual activity: Not on file   Other Topics Concern    Not on file   Social History Narrative    Not on file       Family History   Problem Relation Age of Onset    High Blood Pressure Mother     Stroke Mother         CVA    Kidney Disease Mother    [employer] Other Son         Born with one kidney        REVIEW OF SYSTEMS:     Patient specifically denies fever/chills, chest pain, shortness of breath, new bowel or bladder complaints. All other review of systems was negative.     PHYSICAL EXAMINATION:      BP (!) 148/88   Pulse 86   Temp 97.8 °F (36.6 °C) (Oral)   Resp 16   Ht 5' 4\" (1.626 m)   Wt 155 lb (70.3 kg)   SpO2 99%   BMI 26.61 kg/m²     General:      General appearance:  Pleasant and well-hydrated, in no distress and A & O x 3  Build:Overweight  Function: Rises from a seated position with manner. The overlying skin and subcutaneous tissues were anesthetized with 0.5% Lidocaine. Using the posterior approach, a 25 gauge 1-1/2 inch  needle was advanced  In anterolateral projection into the joint capsule. After negative aspiration a solution of 0.25 % marcaine 3 cc and 30 mg Kenalog was injected easily without complications. The needle was then removed and Band-Aid applied. Disposition the patient tolerated the procedure well and there were no complications. PROCEDURE 2:    PRE-PROCEDURE DIAGNOSIS: Myofascial pain, Cervicalgia. POST-PROCEDURE DIAGNOSIS: Same. PROCEDURE:  Bilateral Cervical paraspinal muscle trigger point injections. SURGEON:   Alethea Vasquez MD    ANESTHESIA: Local    ESTIMATED BLOOD LOSS:  None.  ______________________________________________________________________    BRIEF HISTORY: After discussing the potential risks and benefits of the procedure with the patient, she agreed to proceed. DESCRIPTION OF PROCEDURE:   Each trigger points were marked with patient input and prepped with ChloraPrep and draped, a #25-gauge, 1.5-inch needle was passed into the area of greatest tenderness. A total of 6 trigger point injections were performed. Each trigger point  received 1 cc of 0.25% Marcaine after negative aspiration of blood, air or paresthesia. The needle was removed intact and Band-Aid was applied. Disposition the patient tolerated the procedure well and there were no complications . Kelly Moran will follow up in our [facility] as scheduled. She was encouraged to call with questions, concerns or if worsening of symptoms occurs. Alethea Vasquez MD    > 45 mins spent on the visit with > 50 % time facet to face - counseling/ coordination.     CC:    Maggie Martinez MD  [address] [address] [address] [address] [facility] [address]

## 2020-06-12 ENCOUNTER — HOSPITAL ENCOUNTER (OUTPATIENT)
Age: 85
Discharge: HOME | End: 2020-06-14
Payer: MEDICARE

## 2020-06-12 PROCEDURE — U0003 INFECTIOUS AGENT DETECTION BY NUCLEIC ACID (DNA OR RNA); SEVERE ACUTE RESPIRATORY SYNDROME CORONAVIRUS 2 (SARS-COV-2) (CORONAVIRUS DISEASE [COVID-19]), AMPLIFIED PROBE TECHNIQUE, MAKING USE OF HIGH THROUGHPUT TECHNOLOGIES AS DESCRIBED BY CMS-2020-01-R: HCPCS

## 2020-06-14 LAB
SARS-COV-2: NOT DETECTED
SOURCE: NORMAL

## 2020-06-15 NOTE — PROGRESS NOTES
Have you been tested for COVID 6/12/20         Have you been told you were positive for COVID No  Have you had any known exposure to someone that is positive for COVID No  Do you have a cough                   No              Do you have shortness of breath No                 Do you have a sore throat            No                Are you having chills                    No                Are you having muscle aches. No                    Please come to the hospital wearing a mask and have your significant other wear a mask as well. Both of you should check your temperature before leaving to come here,  if it is 100 or higher please call [phone] for instruction          [address]    . ..........................................................................................................................................       ? Parking the day of Surgery is located in the [facility]. Upon entering the door, make an immediate right to the surgery reception desk    ? Bring photo ID and insurance card     ? You may have a light breakfast day of procedure    ? Wear loose comfortable clothing    ? Please follow instructions for medications as given per Dr's office     ? Stop blood thinners as per Dr's office instructions    ? You can expect a call the business day prior to procedure to notify you if your arrival time changes    ?  Please arrange for

## 2020-06-18 ENCOUNTER — HOSPITAL ENCOUNTER (OUTPATIENT)
Dept: GENERAL RADIOLOGY | Age: 85
Setting detail: OUTPATIENT SURGERY
Discharge: HOME | End: 2020-06-20
Attending: ANESTHESIOLOGY
Payer: MEDICARE

## 2020-06-18 ENCOUNTER — HOSPITAL ENCOUNTER (OUTPATIENT)
Age: 85
Setting detail: OUTPATIENT SURGERY
Discharge: HOME | End: 2020-06-18
Attending: ANESTHESIOLOGY | Admitting: ANESTHESIOLOGY
Payer: MEDICARE

## 2020-06-18 VITALS
HEART RATE: 54 BPM | RESPIRATION RATE: 18 BRPM | OXYGEN SATURATION: 98 % | SYSTOLIC BLOOD PRESSURE: 174 MMHG | TEMPERATURE: 96.4 F | BODY MASS INDEX: 26.58 KG/M2 | DIASTOLIC BLOOD PRESSURE: 87 MMHG | WEIGHT: 150 LBS | HEIGHT: 63 IN

## 2020-06-18 LAB — METER GLUCOSE: 141 MG/DL (ref 74–99)

## 2020-06-18 PROCEDURE — 3209999900 FLUORO FOR SURGICAL PROCEDURES

## 2020-06-18 PROCEDURE — 3600000002 HC SURGERY LEVEL 2 BASE: Performed by: ANESTHESIOLOGY

## 2020-06-18 PROCEDURE — 2709999900 HC NON-CHARGEABLE SUPPLY: Performed by: ANESTHESIOLOGY

## 2020-06-18 PROCEDURE — 6360000002 HC RX W HCPCS: Performed by: ANESTHESIOLOGY

## 2020-06-18 PROCEDURE — 7100000011 HC PHASE II RECOVERY - ADDTL 15 MIN: Performed by: ANESTHESIOLOGY

## 2020-06-18 PROCEDURE — 6360000004 HC RX CONTRAST MEDICATION: Performed by: ANESTHESIOLOGY

## 2020-06-18 PROCEDURE — 2500000003 HC RX 250 WO HCPCS: Performed by: ANESTHESIOLOGY

## 2020-06-18 PROCEDURE — A9579 GAD-BASE MR CONTRAST NOS,1ML: HCPCS | Performed by: ANESTHESIOLOGY

## 2020-06-18 PROCEDURE — 27096 INJECT SACROILIAC JOINT: CPT | Performed by: ANESTHESIOLOGY

## 2020-06-18 PROCEDURE — 7100000010 HC PHASE II RECOVERY - FIRST 15 MIN: Performed by: ANESTHESIOLOGY

## 2020-06-18 PROCEDURE — 82962 GLUCOSE BLOOD TEST: CPT

## 2020-06-18 RX ORDER — BUPIVACAINE HYDROCHLORIDE 2.5 MG/ML
INJECTION, SOLUTION EPIDURAL; INFILTRATION; INTRACAUDAL PRN
Status: DISCONTINUED | OUTPATIENT
Start: 2020-06-18 | End: 2020-06-18 | Stop reason: ALTCHOICE

## 2020-06-18 RX ORDER — LIDOCAINE HYDROCHLORIDE 5 MG/ML
INJECTION, SOLUTION INFILTRATION; INTRAVENOUS PRN
Status: DISCONTINUED | OUTPATIENT
Start: 2020-06-18 | End: 2020-06-18 | Stop reason: ALTCHOICE

## 2020-06-18 RX ORDER — METHYLPREDNISOLONE ACETATE 40 MG/ML
INJECTION, SUSPENSION INTRA-ARTICULAR; INTRALESIONAL; INTRAMUSCULAR; SOFT TISSUE PRN
Status: DISCONTINUED | OUTPATIENT
Start: 2020-06-18 | End: 2020-06-18 | Stop reason: ALTCHOICE

## 2020-06-18 ASSESSMENT — PAIN DESCRIPTION - PROGRESSION
CLINICAL_PROGRESSION_2: GRADUALLY IMPROVING
CLINICAL_PROGRESSION_2: GRADUALLY IMPROVING

## 2020-06-18 ASSESSMENT — PAIN DESCRIPTION - INTENSITY
RATING_2: 4
RATING_2: 2

## 2020-06-18 ASSESSMENT — PAIN SCALES - GENERAL
PAINLEVEL_OUTOF10: 0
PAINLEVEL_OUTOF10: 0

## 2020-06-18 ASSESSMENT — PAIN DESCRIPTION - ORIENTATION
ORIENTATION_2: RIGHT
ORIENTATION_2: RIGHT

## 2020-06-18 ASSESSMENT — PAIN - FUNCTIONAL ASSESSMENT: PAIN_FUNCTIONAL_ASSESSMENT: 0-10

## 2020-06-18 ASSESSMENT — PAIN DESCRIPTION - DESCRIPTORS
DESCRIPTORS: ACHING
DESCRIPTORS_2: DISCOMFORT

## 2020-06-18 ASSESSMENT — PAIN DESCRIPTION - LOCATION
LOCATION_2: ARM
LOCATION_2: ARM
LOCATION: BACK;LEG

## 2020-06-24 RX ORDER — CREAM BASE NO.39
CREAM (GRAM) TOPICAL
Qty: 100 G | Refills: 0 | Status: SHIPPED | OUTPATIENT
Start: 2020-06-24

## 2020-06-24 RX ORDER — LIDOCAINE AND PRILOCAINE 25; 25 MG/G; MG/G
CREAM TOPICAL
Qty: 30 G | Refills: 0 | Status: SHIPPED | OUTPATIENT
Start: 2020-06-24

## 2020-06-24 RX ORDER — LIDOCAINE 50 MG/G
OINTMENT TOPICAL
Qty: 212.64 G | Refills: 0 | Status: SHIPPED | OUTPATIENT
Start: 2020-06-24

## 2020-07-07 ENCOUNTER — OFFICE VISIT (OUTPATIENT)
Dept: PODIATRY | Age: 85
End: 2020-07-07
Payer: MEDICARE

## 2020-07-07 VITALS — TEMPERATURE: 97.2 F

## 2020-07-07 PROCEDURE — 1090F PRES/ABSN URINE INCON ASSESS: CPT | Performed by: PODIATRIST

## 2020-07-07 PROCEDURE — 4040F PNEUMOC VAC/ADMIN/RCVD: CPT | Performed by: PODIATRIST

## 2020-07-07 PROCEDURE — 1123F ACP DISCUSS/DSCN MKR DOCD: CPT | Performed by: PODIATRIST

## 2020-07-07 PROCEDURE — G8417 CALC BMI ABV UP PARAM F/U: HCPCS | Performed by: PODIATRIST

## 2020-07-07 PROCEDURE — 1036F TOBACCO NON-USER: CPT | Performed by: PODIATRIST

## 2020-07-07 PROCEDURE — 99213 OFFICE O/P EST LOW 20 MIN: CPT | Performed by: PODIATRIST

## 2020-07-07 PROCEDURE — G8428 CUR MEDS NOT DOCUMENT: HCPCS | Performed by: PODIATRIST

## 2020-07-07 NOTE — PROGRESS NOTES
20     Billie Mott    : 1928   Sex: female    Age: 80 y.o. Patient's PCP/Provider is:  Deja Loving MD    Subjective:  Patient is seen today for evaluation regarding painful callus right great toe. Patient stated that the issue started after her previous dropfoot brace. She stopped wearing it recently due to increased discomfort. She did have a new brace fabricated for her which is more comfortable at this time. She denies any nausea, vomiting, fever, chills. No additional issues noted at this time. Chief Complaint   Patient presents with    Callouses    Foot Pain       ROS:  Const: Positives and pertinent negatives as per HPI. Musculo: Denies symptoms other than stated above. Neuro: Denies symptoms other than stated above. Skin: Denies symptoms other than stated above. Current Medications:    Current Outpatient Medications:     lidocaine (XYLOCAINE) 5 % ointment, APPLY TWO GRAMS EXTERNALLY TO PAIN AREA FOUR TIMES A DAY.  MAX OF 8 GRAMS PER DAY , Disp: 212.64 g, Rfl: 0    Cream Base (VERSAPRO) CREA, APPLY ONE GRAM (ONE PUMP) EXTERNALLY FOUR TIMES A DAY , Disp: 100 g, Rfl: 0    lidocaine-prilocaine (EMLA) 2.5-2.5 % cream, APPLY ONE GRAM EXTERNALLY THREE TIMES A DAY IF NEEDED-MUST LAST 30 DAYS , Disp: 30 g, Rfl: 0    acetaminophen (TYLENOL 8 HOUR) 650 MG extended release tablet, Take 650 mg by mouth 3 times daily , Disp: , Rfl:     ALPRAZolam (XANAX) 0.25 MG tablet, TAKE 1 TABLET BY MOUTH NIGHTLY AS NEEDED FOR SLEEP FOR UP TO 30 DAYS., Disp: , Rfl:     amLODIPine (NORVASC) 5 MG tablet, Take 1 tablet by mouth 2 times daily, Disp: 180 tablet, Rfl: 1    hydrochlorothiazide (HYDRODIURIL) 12.5 MG tablet, Take 1 tablet by mouth daily, Disp: 90 tablet, Rfl: 1    lisinopril (PRINIVIL;ZESTRIL) 20 MG tablet, Take 1 tablet by mouth daily, Disp: 90 tablet, Rfl: 1    metoprolol succinate (TOPROL XL) 50 MG extended release tablet, Take 1 tablet by mouth daily, Disp: 90 tablet, Rfl: 1    potassium chloride (MICRO-K) 10 MEQ extended release capsule, 3 po daily, Disp: 270 capsule, Rfl: 3    rosuvastatin (CRESTOR) 20 MG tablet, Take 1 tablet by mouth daily, Disp: 90 tablet, Rfl: 1    predniSONE (DELTASONE) 5 MG tablet, TAKE 1 TABLET BY MOUTH ONCE DAILY, Disp: , Rfl: 0    BD PEN NEEDLE RISA U/F 32G X 4 MM MISC, , Disp: , Rfl:     Glucosamine-Chondroitin 8530-5674 MG/30ML LIQD, Take by mouth, Disp: , Rfl:     insulin lispro (HUMALOG KWIKPEN) 200 UNIT/ML SOPN pen, 14units q am, Disp: 3 pen, Rfl: 1    insulin glargine (BASAGLAR KWIKPEN) 100 UNIT/ML injection pen, Inject 20 Units into the skin nightly, Disp: 5 pen, Rfl: 1    diclofenac sodium 1 % GEL, Apply pea size to right shoulder three times per day as needd, Disp: , Rfl:     Probiotic Product (VSL#3 PO), Take 1 tablet by mouth 2 times daily, Disp: , Rfl:     vitamin D 1000 UNITS CAPS, Take 1 capsule by mouth 2 times daily, Disp: , Rfl:     Coenzyme Q10 (CO Q 10) 10 MG CAPS, Take by mouth, Disp: , Rfl:     Calcium Carbonate-Vit D-Min (CALCIUM 1200 PO), Take by mouth, Disp: , Rfl:     Allergies: Allergies   Allergen Reactions    Iv Dye [Iodides] Hives    Pcn [Penicillins] Hives    Relafen [Nabumetone] Hives    Niaspan [Niacin Er]      Night sweats       Vitals:    20 1107   Temp: 97.2 °F (36.2 °C)       Exam:  Neurovascular status unchanged. Hyperkeratotic area noted to the plantar medial IPJ region right great toe. No underlying ulcerative area noted or any signs of infection after partial thickness, non-excisional debridement was performed with a #15 blade. No edema or ecchymotic skin changes noted right foot.     Diagnostic Studies:     Xr Lumbar Spine (min 4 Views)    Result Date: 2020  Patient MRN: [id number] : 1928 Age:  80 years Gender: Female Order Date: 2020 4:28 PM Exam: XR LUMBAR SPINE (MIN 4 VIEWS) Number of Images: views Indication:  M47.817 Lumbosacral spondylosis without myelopathy low back pain joint No acute fracture distal.     Fluoro For Surgical Procedures    Result Date: 2020  Patient MRN: [id number] : 1928 Age:  80 years Gender: Female Order Date: 2020 8:33 AM Exam: FLUORO FOR SURGICAL PROCEDURES Number of Images: 1 views Indication:  R52 Pain management right sacroiliac joint injection under fluoroscopy Comparison: None. Findings: Demographic pages submitted which demonstrate 8 seconds of fluoroscopy, DAP 2.27 mGy. Injection was performed by the clinical service. 1 images are submitted. Images demonstrate evidence for placement of the needle and the right sacroiliac joint for a steroid injection. Fluoroscopic guided right sacroiliac joint steroid injection. The exam has been dictated and signed by Cinthia Matthew. AJIT Perez and Avinash Connolly MD, reviewed and concurred with these findings. Procedures:    None    Plan Per Assessment  Michelle Gordon was seen today for callouses and foot pain. Diagnoses and all orders for this visit:    Foot drop, right foot    Corns and callus    PVD (peripheral vascular disease) ([address]    Difficulty walking      1. Evaluation and management  2. We did recommend the use of topical moisturizing cream to the digital regions bilateral foot to prevent further issues. 3. We did recommend continued use of the new dropfoot brace on the right lower extremity to prevent any additional lower extremity issues. 4. Patient will be followed up for her regularly scheduled foot care appointment or sooner if needed. Seen By:    Davis Valdez DPM    Electronically signed by Davis Valdez DPM on 2020 at 11:17 AM    This note was created using voice recognition software. The note was reviewed however may contain grammatical errors.

## 2020-07-09 RX ORDER — ALPRAZOLAM 0.25 MG/1
TABLET ORAL
Qty: 30 TABLET | Refills: 0 | Status: SHIPPED | OUTPATIENT
Start: 2020-07-09 | End: 2020-08-09

## 2020-07-09 NOTE — TELEPHONE ENCOUNTER
Last Appointment:  5/29/2020  Future Appointments   Date Time Provider Brenden Calvin   7/27/2020  2:00 PM Trudi Arias MD [facility]   8/28/2020  1:30 PM Joy Altman MD [facility]      Patient needs pended med refilled.     Electronically signed by Deedee Coronel LPN on 8/0/3236 at 06:33 AM

## 2020-08-11 ENCOUNTER — OFFICE VISIT (OUTPATIENT)
Dept: PODIATRY | Age: 85
End: 2020-08-11
Payer: MEDICARE

## 2020-08-11 VITALS
WEIGHT: 150 LBS | SYSTOLIC BLOOD PRESSURE: 148 MMHG | BODY MASS INDEX: 26.58 KG/M2 | TEMPERATURE: 97.8 F | DIASTOLIC BLOOD PRESSURE: 82 MMHG | HEIGHT: 63 IN

## 2020-08-11 PROBLEM — L84 CORNS AND CALLUS: Status: ACTIVE | Noted: 2020-08-11

## 2020-08-11 PROCEDURE — 1123F ACP DISCUSS/DSCN MKR DOCD: CPT | Performed by: PODIATRIST

## 2020-08-11 PROCEDURE — G8427 DOCREV CUR MEDS BY ELIG CLIN: HCPCS | Performed by: PODIATRIST

## 2020-08-11 PROCEDURE — 3051F HG A1C>EQUAL 7.0%<8.0%: CPT | Performed by: PODIATRIST

## 2020-08-11 PROCEDURE — 4040F PNEUMOC VAC/ADMIN/RCVD: CPT | Performed by: PODIATRIST

## 2020-08-11 PROCEDURE — 1090F PRES/ABSN URINE INCON ASSESS: CPT | Performed by: PODIATRIST

## 2020-08-11 PROCEDURE — 1036F TOBACCO NON-USER: CPT | Performed by: PODIATRIST

## 2020-08-11 PROCEDURE — 99213 OFFICE O/P EST LOW 20 MIN: CPT | Performed by: PODIATRIST

## 2020-08-11 PROCEDURE — G8417 CALC BMI ABV UP PARAM F/U: HCPCS | Performed by: PODIATRIST

## 2020-08-11 NOTE — PROGRESS NOTES
20     Darwin Iyer    : 1928   Sex: female    Age: 80 y.o. Patient's PCP/Provider is:  Dann Joshi MD    Subjective:  Patient is seen today for follow-up regarding continued care painful callus plantar aspect right great toe. Patient still has been trying to get her custom dropfoot brace adjusted. She presented today for continued evaluation due to painful callus. She denies any nausea, vomiting, fever, chills. She denies any additional issues at this time. Chief Complaint   Patient presents with    Callouses       ROS:  Const: Positives and pertinent negatives as per HPI. Musculo: Denies symptoms other than stated above. Neuro: Denies symptoms other than stated above. Skin: Denies symptoms other than stated above. Current Medications:    Current Outpatient Medications:     lidocaine (XYLOCAINE) 5 % ointment, APPLY TWO GRAMS EXTERNALLY TO PAIN AREA FOUR TIMES A DAY.  MAX OF 8 GRAMS PER DAY , Disp: 212.64 g, Rfl: 0    Cream Base (VERSAPRO) CREA, APPLY ONE GRAM (ONE PUMP) EXTERNALLY FOUR TIMES A DAY , Disp: 100 g, Rfl: 0    lidocaine-prilocaine (EMLA) 2.5-2.5 % cream, APPLY ONE GRAM EXTERNALLY THREE TIMES A DAY IF NEEDED-MUST LAST 30 DAYS , Disp: 30 g, Rfl: 0    acetaminophen (TYLENOL 8 HOUR) 650 MG extended release tablet, Take 650 mg by mouth 3 times daily , Disp: , Rfl:     amLODIPine (NORVASC) 5 MG tablet, Take 1 tablet by mouth 2 times daily, Disp: 180 tablet, Rfl: 1    hydrochlorothiazide (HYDRODIURIL) 12.5 MG tablet, Take 1 tablet by mouth daily, Disp: 90 tablet, Rfl: 1    lisinopril (PRINIVIL;ZESTRIL) 20 MG tablet, Take 1 tablet by mouth daily, Disp: 90 tablet, Rfl: 1    metoprolol succinate (TOPROL XL) 50 MG extended release tablet, Take 1 tablet by mouth daily, Disp: 90 tablet, Rfl: 1    potassium chloride (MICRO-K) 10 MEQ extended release capsule, 3 po daily, Disp: 270 capsule, Rfl: 3    rosuvastatin (CRESTOR) 20 MG tablet, Take 1 tablet by mouth daily, Disp: 90 tablet, Rfl: 1    predniSONE (DELTASONE) 5 MG tablet, TAKE 1 TABLET BY MOUTH ONCE DAILY, Disp: , Rfl: 0    BD PEN NEEDLE RISA U/F 32G X 4 MM MISC, , Disp: , Rfl:     Glucosamine-Chondroitin 7532-6057 MG/30ML LIQD, Take by mouth, Disp: , Rfl:     insulin lispro (HUMALOG KWIKPEN) 200 UNIT/ML SOPN pen, 14units q am, Disp: 3 pen, Rfl: 1    insulin glargine (BASAGLAR KWIKPEN) 100 UNIT/ML injection pen, Inject 20 Units into the skin nightly, Disp: 5 pen, Rfl: 1    diclofenac sodium 1 % GEL, Apply pea size to right shoulder three times per day as needd, Disp: , Rfl:     Probiotic Product (VSL#3 PO), Take 1 tablet by mouth 2 times daily, Disp: , Rfl:     vitamin D 1000 UNITS CAPS, Take 1 capsule by mouth 2 times daily, Disp: , Rfl:     Coenzyme Q10 (CO Q 10) 10 MG CAPS, Take by mouth, Disp: , Rfl:     Calcium Carbonate-Vit D-Min (CALCIUM 1200 PO), Take by mouth, Disp: , Rfl:     Allergies: Allergies   Allergen Reactions    Iv Dye [Iodides] Hives    Pcn [Penicillins] Hives    Relafen [Nabumetone] Hives    Niaspan [Niacin Er]      Night sweats       Vitals:    08/11/20 1441   BP: (!) 148/82   Temp: 97.8 °F (36.6 °C)   Weight: 150 lb (68 kg)   Height: 5' 3\" (1.6 m)       Exam:  Neurovascular status unchanged. Hyperkeratotic area noted plantar aspect right great toe. Upon partial thickness, non-excisional debridement no underlying ulceration noted. No signs of infection noted right great toe. No maceration of the webspaces noted right foot. Diagnostic Studies:     No results found. Procedures:    None    Plan Per Assessment  Michaela Husain was seen today for callouses. Diagnoses and all orders for this visit:    Foot drop, right foot    Corns and callus    Type II diabetes mellitus with peripheral circulatory disorder ([facility] [address]      1. Evaluation and management  2. Did discuss appropriate skin care techniques to allow complete healing of the corn/callus region.   3. He did dispense a toe crest pad to offload the right great toe region. Did recommend patient be contacting her Orthotist to get her dropfoot brace adjusted properly. 4. Patient will be followed up for her regularly scheduled foot care appointment or sooner if needed. Seen By:    Kelby Lepe DPM    Electronically signed by Kelby Lepe DPM on 8/11/2020 at 7:21 PM    This note was created using voice recognition software. The note was reviewed however may contain grammatical errors.

## 2020-09-09 ENCOUNTER — PROCEDURE VISIT (OUTPATIENT)
Dept: PODIATRY | Age: 85
End: 2020-09-09
Payer: MEDICARE

## 2020-09-09 VITALS
HEIGHT: 63 IN | BODY MASS INDEX: 26.58 KG/M2 | DIASTOLIC BLOOD PRESSURE: 80 MMHG | SYSTOLIC BLOOD PRESSURE: 142 MMHG | WEIGHT: 150 LBS | TEMPERATURE: 97.4 F

## 2020-09-09 PROCEDURE — 11721 DEBRIDE NAIL 6 OR MORE: CPT | Performed by: PODIATRIST

## 2020-09-09 NOTE — PROGRESS NOTES
20     Viktor Louis    : 1928  Sex: female  Age: 80 y.o. Subjective: The patient is seen today for evaluation regarding diabetic foot evaluation and mycotic nail care. No other complaints noted. Chief Complaint   Patient presents with    Nail Problem       Current Medications:    Current Outpatient Medications:     lidocaine (XYLOCAINE) 5 % ointment, APPLY TWO GRAMS EXTERNALLY TO PAIN AREA FOUR TIMES A DAY.  MAX OF 8 GRAMS PER DAY , Disp: 212.64 g, Rfl: 0    Cream Base (VERSAPRO) CREA, APPLY ONE GRAM (ONE PUMP) EXTERNALLY FOUR TIMES A DAY , Disp: 100 g, Rfl: 0    lidocaine-prilocaine (EMLA) 2.5-2.5 % cream, APPLY ONE GRAM EXTERNALLY THREE TIMES A DAY IF NEEDED-MUST LAST 30 DAYS , Disp: 30 g, Rfl: 0    acetaminophen (TYLENOL 8 HOUR) 650 MG extended release tablet, Take 650 mg by mouth 3 times daily , Disp: , Rfl:     amLODIPine (NORVASC) 5 MG tablet, Take 1 tablet by mouth 2 times daily, Disp: 180 tablet, Rfl: 1    hydrochlorothiazide (HYDRODIURIL) 12.5 MG tablet, Take 1 tablet by mouth daily, Disp: 90 tablet, Rfl: 1    lisinopril (PRINIVIL;ZESTRIL) 20 MG tablet, Take 1 tablet by mouth daily, Disp: 90 tablet, Rfl: 1    metoprolol succinate (TOPROL XL) 50 MG extended release tablet, Take 1 tablet by mouth daily, Disp: 90 tablet, Rfl: 1    potassium chloride (MICRO-K) 10 MEQ extended release capsule, 3 po daily, Disp: 270 capsule, Rfl: 3    rosuvastatin (CRESTOR) 20 MG tablet, Take 1 tablet by mouth daily, Disp: 90 tablet, Rfl: 1    predniSONE (DELTASONE) 5 MG tablet, TAKE 1 TABLET BY MOUTH ONCE DAILY, Disp: , Rfl: 0    BD PEN NEEDLE RISA U/F 32G X 4 MM MISC, , Disp: , Rfl:     Glucosamine-Chondroitin 8006-8848 MG/30ML LIQD, Take by mouth, Disp: , Rfl:     insulin lispro (HUMALOG KWIKPEN) 200 UNIT/ML SOPN pen, 14units q am, Disp: 3 pen, Rfl: 1    insulin glargine (BASAGLAR KWIKPEN) 100 UNIT/ML injection pen, Inject 20 Units into the skin nightly, Disp: 5 pen, Rfl: 1   diclofenac sodium 1 % GEL, Apply pea size to right shoulder three times per day as needd, Disp: , Rfl:     Probiotic Product (VSL#3 PO), Take 1 tablet by mouth 2 times daily, Disp: , Rfl:     vitamin D 1000 UNITS CAPS, Take 1 capsule by mouth 2 times daily, Disp: , Rfl:     Coenzyme Q10 (CO Q 10) 10 MG CAPS, Take by mouth, Disp: , Rfl:     Calcium Carbonate-Vit D-Min (CALCIUM 1200 PO), Take by mouth, Disp: , Rfl:     Allergies: Allergies   Allergen Reactions    Iv Dye [Iodides] Hives    Pcn [Penicillins] Hives    Relafen [Nabumetone] Hives    Niaspan [Niacin Er]      Night sweats       Past Surgical History:   Procedure Laterality Date    ANESTHESIA NERVE BLOCK Right 6/18/2020    RIGHT SACROILIAC JOINT INJECTION UNDER FLUOROSCOPY performed by Sandy Doe MD at [place] [address] Left 10/2012    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 Priya Colvin will be having MOH's     WRIST FRACTURE SURGERY Bilateral 02/2015    while in [place]      Past Medical History:   Diagnosis Date    Anxiety     Chronic kidney disease     Decreased bone density     Foot drop, right foot     Hyperlipidemia     Hypertension     Intervertebral disc disorder     Lumbar stenosis     Nontoxic multinodular goiter     Osteoarthritis     Osteopenia     Polymyalgia rheumatica (HCC)     TIA (transient ischemic attack) 2005    Type 2 diabetes mellitus (HCC)     Vitamin D deficiency        Vitals:    09/09/20 1353   BP: (!) 142/80   Temp: 97.4 °F (36.3 °C)   Weight: 150 lb (68 kg)   Height: 5' 3\" (1.6 m)       Exam:  Neurovascular status unchanged. At this time the nail/s 1, 2, 3, 4, 5 right foot and nail/s 1, 2, 3, 4, 5 left foot are noted to be thickened, dystrophic and discolored with subungual debris present. Tenderness noted to palpation. Diminished hair growth is noted to both lower extremities.   Edema noted with both varicosities and stasis skin changes present bilaterally. Coolness is noted to the digital regions to palpation. Capillary fill time delayed digital areas bilateral foot. No heel fissuring or macerations of the web spaces. No plantar calluses and/or ulcerative areas are noted. Patient is having difficulty with gait/walking. Plan Per Assessment  Michelle Gordon was seen today for nail problem. Diagnoses and all orders for this visit:    Onychomycosis    Pain of toe of right foot    Pain of toe of left foot    PVD (peripheral vascular disease) ([address]    Type II diabetes mellitus with peripheral circulatory disorder (HCC)    Foot drop, right foot    Difficulty walking        1. Evaluation and Management  2. Manual and electrical debridement of the mycotic nails was performed for thickness and length to prevent injection and/or ulceration. 3. I recommended antifungal cream to the nails daily. Patient was advised on wearing her dropfoot brace to prevent any further irritative changes to the right great toe. Shoe recommendations were discussed with the patient in detail today to give her added support into the ball of the foot regions bilaterally. 4. It was discussed in detail with the patient proper caring for the vascular compromised foot. The fact that they have compromised blood flow put the patient at risk for infection/gangrene/amputation. The patient should not walk barefoot. Shoe gear should fit properly and socks should be worn with shoes. Exercise is very important to prevent worsening of the disease process but before performing an exercise program should check with their family physician first.  If any skin lesions are noted, they are instructed to contact the office immediately. 5. It was discussed in detail with the patient proper hygiene for the diabetic foot. They are to get in the habit of looking at their feet or have someone look at them.    If they are unable to do daily, they are to look for any signs of redness, blistering, cracking, swelling, drainage, open lesions, etc.  They are to dry in between the toes after each bath or shower gently. The water should be tested with the elbow to prevent burns. The patient is to refrain from soaking their feet unless instructed by myself to do otherwise. They are to refrain from going barefoot. Shoe gear should be inspected for any foreign objects. Shoes should have a deep wide toe box. With any type of shoe, the feet should be inspected for any signs of pressure, i.e. redness, blistering, or open sores. Further instructional guidelines were dispensed to the patient. 6. We will see the patient back at a later date for continued podiatric management and care. Patient was advised to call the office with any questions or concerns prior to their next appointment if needed. Seen By:    Mary Henson DPM    Electronically signed by Mary Henson DPM on 9/9/2020 at 2:52 PM      This note was created using voice recognition software. The note was reviewed however may contain grammatical errors.

## 2020-09-09 NOTE — PROGRESS NOTES
Patient in today for nail care.   Patient's PCP is Alex Lopez MD date of last ov   5-      Heidi Buck LPN

## 2020-09-21 ENCOUNTER — OFFICE VISIT (OUTPATIENT)
Dept: PODIATRY | Age: 85
End: 2020-09-21
Payer: MEDICARE

## 2020-09-21 PROBLEM — L03.031 CELLULITIS OF TOE OF RIGHT FOOT: Status: ACTIVE | Noted: 2020-09-21

## 2020-09-21 PROCEDURE — G8417 CALC BMI ABV UP PARAM F/U: HCPCS | Performed by: PODIATRIST

## 2020-09-21 PROCEDURE — 4040F PNEUMOC VAC/ADMIN/RCVD: CPT | Performed by: PODIATRIST

## 2020-09-21 PROCEDURE — 1123F ACP DISCUSS/DSCN MKR DOCD: CPT | Performed by: PODIATRIST

## 2020-09-21 PROCEDURE — 99213 OFFICE O/P EST LOW 20 MIN: CPT | Performed by: PODIATRIST

## 2020-09-21 PROCEDURE — 1090F PRES/ABSN URINE INCON ASSESS: CPT | Performed by: PODIATRIST

## 2020-09-21 PROCEDURE — 1036F TOBACCO NON-USER: CPT | Performed by: PODIATRIST

## 2020-09-21 PROCEDURE — G8427 DOCREV CUR MEDS BY ELIG CLIN: HCPCS | Performed by: PODIATRIST

## 2020-09-21 RX ORDER — DOXYCYCLINE HYCLATE 100 MG
100 TABLET ORAL 2 TIMES DAILY
Qty: 28 TABLET | Refills: 0 | Status: SHIPPED | OUTPATIENT
Start: 2020-09-21 | End: 2020-10-05

## 2020-09-21 NOTE — PROGRESS NOTES
20     Lakisha Matters    : 1928   Sex: female    Age: 80 y.o. Patient's PCP/Provider is:  Leida Frye MD    Subjective:  Patient is seen today for follow-up regarding continued care painful corn plantar aspect right great toe. Patient did not apply the topical antibiotic previously recommended in regards to treatment. He is still not utilizing the dropfoot brace on the right lower extremity as well. He presents today due to some increased pain and swelling to her right great toe. Patient is in no acute distress. She denies any nausea, vomiting, fever, chills. Chief Complaint   Patient presents with    Callouses    Foot Pain       ROS:  Const: Positives and pertinent negatives as per HPI. Musculo: Denies symptoms other than stated above. Neuro: Denies symptoms other than stated above. Skin: Denies symptoms other than stated above. Current Medications:    Current Outpatient Medications:     doxycycline hyclate (VIBRA-TABS) 100 MG tablet, Take 1 tablet by mouth 2 times daily for 14 days, Disp: 28 tablet, Rfl: 0    lidocaine (XYLOCAINE) 5 % ointment, APPLY TWO GRAMS EXTERNALLY TO PAIN AREA FOUR TIMES A DAY.  MAX OF 8 GRAMS PER DAY , Disp: 212.64 g, Rfl: 0    Cream Base (VERSAPRO) CREA, APPLY ONE GRAM (ONE PUMP) EXTERNALLY FOUR TIMES A DAY , Disp: 100 g, Rfl: 0    lidocaine-prilocaine (EMLA) 2.5-2.5 % cream, APPLY ONE GRAM EXTERNALLY THREE TIMES A DAY IF NEEDED-MUST LAST 30 DAYS , Disp: 30 g, Rfl: 0    acetaminophen (TYLENOL 8 HOUR) 650 MG extended release tablet, Take 650 mg by mouth 3 times daily , Disp: , Rfl:     amLODIPine (NORVASC) 5 MG tablet, Take 1 tablet by mouth 2 times daily, Disp: 180 tablet, Rfl: 1    hydrochlorothiazide (HYDRODIURIL) 12.5 MG tablet, Take 1 tablet by mouth daily, Disp: 90 tablet, Rfl: 1    lisinopril (PRINIVIL;ZESTRIL) 20 MG tablet, Take 1 tablet by mouth daily, Disp: 90 tablet, Rfl: 1    metoprolol succinate (TOPROL XL) 50 MG extended release tablet, Take 1 tablet by mouth daily, Disp: 90 tablet, Rfl: 1    potassium chloride (MICRO-K) 10 MEQ extended release capsule, 3 po daily, Disp: 270 capsule, Rfl: 3    rosuvastatin (CRESTOR) 20 MG tablet, Take 1 tablet by mouth daily, Disp: 90 tablet, Rfl: 1    predniSONE (DELTASONE) 5 MG tablet, TAKE 1 TABLET BY MOUTH ONCE DAILY, Disp: , Rfl: 0    BD PEN NEEDLE RISA U/F 32G X 4 MM MISC, , Disp: , Rfl:     Glucosamine-Chondroitin 9651-5677 MG/30ML LIQD, Take by mouth, Disp: , Rfl:     insulin lispro (HUMALOG KWIKPEN) 200 UNIT/ML SOPN pen, 14units q am, Disp: 3 pen, Rfl: 1    insulin glargine (BASAGLAR KWIKPEN) 100 UNIT/ML injection pen, Inject 20 Units into the skin nightly, Disp: 5 pen, Rfl: 1    diclofenac sodium 1 % GEL, Apply pea size to right shoulder three times per day as needd, Disp: , Rfl:     Probiotic Product (VSL#3 PO), Take 1 tablet by mouth 2 times daily, Disp: , Rfl:     vitamin D 1000 UNITS CAPS, Take 1 capsule by mouth 2 times daily, Disp: , Rfl:     Coenzyme Q10 (CO Q 10) 10 MG CAPS, Take by mouth, Disp: , Rfl:     Calcium Carbonate-Vit D-Min (CALCIUM 1200 PO), Take by mouth, Disp: , Rfl:     Allergies: Allergies   Allergen Reactions    Iv Dye [Iodides] Hives    Pcn [Penicillins] Hives    Relafen [Nabumetone] Hives    Niaspan [Niacin Er]      Night sweats       There were no vitals filed for this visit. Exam:  Neurovascular status unchanged. Callus area noted with mild central abrasion measuring approximately 0.3 cm in diameter. No underlying ulceration noted. No undermining of the edges noted. No exposed bone, tendon, ligamentous structures noted right great toe. Mild erythema noted right great toe without skin crepitation or ascending cellulitic issues noted. Diagnostic Studies:     No results found. Procedures:    None    Plan Per Assessment  Yamilka Ruiz was seen today for callouses and foot pain.     Diagnoses and all orders for this visit:    Corns and callus    Pain of toe of right foot    PVD (peripheral vascular disease) (HCC)    Foot drop, right foot    Cellulitis of toe of right foot  -     doxycycline hyclate (VIBRA-TABS) 100 MG tablet; Take 1 tablet by mouth 2 times daily for 14 days      1. Evaluation and management  2. We did advise the patient to cleanse the right great toe with saline, apply topical antibiotic which was supplied for the patient today and a Band-Aid to the area to be changed daily. 3. Patient was given a prescription for Doxycycline to be taken as directed over the next 14 days. She was advised continued use of the toe crest pad for proper offloading digital regions right foot. 4. Patient will be followed up in 1 week's time or sooner if needed for continued evaluation and care. She was advised to call the office with any questions or concerns in the interim. Seen By:    Ajay Luciano DPM    Electronically signed by Ajay Luciano DPM on 9/21/2020 at 5:01 PM    This note was created using voice recognition software. The note was reviewed however may contain grammatical errors.

## 2020-09-30 ENCOUNTER — OFFICE VISIT (OUTPATIENT)
Dept: PODIATRY | Age: 85
End: 2020-09-30
Payer: MEDICARE

## 2020-09-30 VITALS — BODY MASS INDEX: 26.58 KG/M2 | WEIGHT: 150 LBS | HEIGHT: 63 IN

## 2020-09-30 PROCEDURE — 4040F PNEUMOC VAC/ADMIN/RCVD: CPT | Performed by: PODIATRIST

## 2020-09-30 PROCEDURE — 99213 OFFICE O/P EST LOW 20 MIN: CPT | Performed by: PODIATRIST

## 2020-09-30 PROCEDURE — 1036F TOBACCO NON-USER: CPT | Performed by: PODIATRIST

## 2020-09-30 PROCEDURE — 1090F PRES/ABSN URINE INCON ASSESS: CPT | Performed by: PODIATRIST

## 2020-09-30 PROCEDURE — 1123F ACP DISCUSS/DSCN MKR DOCD: CPT | Performed by: PODIATRIST

## 2020-09-30 PROCEDURE — G8428 CUR MEDS NOT DOCUMENT: HCPCS | Performed by: PODIATRIST

## 2020-09-30 PROCEDURE — G8417 CALC BMI ABV UP PARAM F/U: HCPCS | Performed by: PODIATRIST

## 2020-09-30 NOTE — PROGRESS NOTES
Patient is in today for 1 week great toe pain. Patient has callous on the big toe.  pcp is Shawn Darby MD

## 2020-10-01 PROBLEM — L03.031 CELLULITIS OF TOE OF RIGHT FOOT: Status: RESOLVED | Noted: 2020-09-21 | Resolved: 2020-10-01

## 2020-10-01 NOTE — PROGRESS NOTES
metoprolol succinate (TOPROL XL) 50 MG extended release tablet, Take 1 tablet by mouth daily, Disp: 90 tablet, Rfl: 1    potassium chloride (MICRO-K) 10 MEQ extended release capsule, 3 po daily, Disp: 270 capsule, Rfl: 3    rosuvastatin (CRESTOR) 20 MG tablet, Take 1 tablet by mouth daily, Disp: 90 tablet, Rfl: 1    predniSONE (DELTASONE) 5 MG tablet, TAKE 1 TABLET BY MOUTH ONCE DAILY, Disp: , Rfl: 0    BD PEN NEEDLE RISA U/F 32G X 4 MM MISC, , Disp: , Rfl:     Glucosamine-Chondroitin 7792-1188 MG/30ML LIQD, Take by mouth, Disp: , Rfl:     insulin lispro (HUMALOG KWIKPEN) 200 UNIT/ML SOPN pen, 14units q am, Disp: 3 pen, Rfl: 1    insulin glargine (BASAGLAR KWIKPEN) 100 UNIT/ML injection pen, Inject 20 Units into the skin nightly, Disp: 5 pen, Rfl: 1    diclofenac sodium 1 % GEL, Apply pea size to right shoulder three times per day as needd, Disp: , Rfl:     Probiotic Product (VSL#3 PO), Take 1 tablet by mouth 2 times daily, Disp: , Rfl:     vitamin D 1000 UNITS CAPS, Take 1 capsule by mouth 2 times daily, Disp: , Rfl:     Coenzyme Q10 (CO Q 10) 10 MG CAPS, Take by mouth, Disp: , Rfl:     Calcium Carbonate-Vit D-Min (CALCIUM 1200 PO), Take by mouth, Disp: , Rfl:     Allergies: Allergies   Allergen Reactions    Iv Dye [Iodides] Hives    Pcn [Penicillins] Hives    Relafen [Nabumetone] Hives    Niaspan [Niacin Er]      Night sweats       Vitals:    09/30/20 1544   Weight: 150 lb (68 kg)   Height: 5' 3\" (1.6 m)       Exam:  Neurovascular status unchanged. Callused areas improved plantar aspect right great toe. No purulence, odor, erythema or any signs of infection noted right great toe. No edema or ecchymotic skin changes present right great toe. No maceration webspaces noted right foot. Diagnostic Studies:     No results found. Procedures:    None     Plan Per Assessment  Brett Lanier was seen today for toe pain.     Diagnoses and all orders for this visit:    Corns and callus    PVD (peripheral vascular disease) (HCC)    Foot drop, right foot    Difficulty walking      1. Evaluation and management  2. We did discuss continued skin care techniques to allow for continued improvement. She was to continue wearing a dropfoot brace at all times with existing shoe gear and ambulatory activities. 3. Patient was advised continued use of the toe crest pad for conservative care measures. 4. Patient will be followed up for her regularly scheduled foot care appointment or sooner if needed. Seen By:    Rolando Cuevas DPM    Electronically signed by Rolando Cuevas DPM on 10/1/2020 at 12:27 PM    This note was created using voice recognition software. The note was reviewed however may contain grammatical errors.

## 2020-11-04 ENCOUNTER — PROCEDURE VISIT (OUTPATIENT)
Dept: PODIATRY | Age: 85
End: 2020-11-04
Payer: MEDICARE

## 2020-11-04 VITALS — BODY MASS INDEX: 26.58 KG/M2 | HEIGHT: 63 IN | WEIGHT: 150 LBS

## 2020-11-04 PROCEDURE — 1036F TOBACCO NON-USER: CPT | Performed by: PODIATRIST

## 2020-11-04 PROCEDURE — 3051F HG A1C>EQUAL 7.0%<8.0%: CPT | Performed by: PODIATRIST

## 2020-11-04 PROCEDURE — G8427 DOCREV CUR MEDS BY ELIG CLIN: HCPCS | Performed by: PODIATRIST

## 2020-11-04 PROCEDURE — G8417 CALC BMI ABV UP PARAM F/U: HCPCS | Performed by: PODIATRIST

## 2020-11-04 PROCEDURE — G8484 FLU IMMUNIZE NO ADMIN: HCPCS | Performed by: PODIATRIST

## 2020-11-04 PROCEDURE — 1090F PRES/ABSN URINE INCON ASSESS: CPT | Performed by: PODIATRIST

## 2020-11-04 PROCEDURE — 99213 OFFICE O/P EST LOW 20 MIN: CPT | Performed by: PODIATRIST

## 2020-11-04 PROCEDURE — 4040F PNEUMOC VAC/ADMIN/RCVD: CPT | Performed by: PODIATRIST

## 2020-11-04 PROCEDURE — 1123F ACP DISCUSS/DSCN MKR DOCD: CPT | Performed by: PODIATRIST

## 2020-11-04 NOTE — PROGRESS NOTES
20     Martha Parra    : 1928   Sex: female    Age: 80 y.o. Patient's PCP/Provider is:  Alia Palu MD    Subjective:  Patient is seen today for follow-up regarding continued care corn/callus right great toe secondary to dropfoot deformity right. She has been trying to wear her brace as instructed more regularly. She has not been applying anything to the right great toe site. Patient is in no acute distress on today's visit. Patient denies additional issues at this time. Chief Complaint   Patient presents with    Callouses    Foot Pain       ROS:  Const: Positives and pertinent negatives as per HPI. Musculo: Denies symptoms other than stated above. Neuro: Denies symptoms other than stated above. Skin: Denies symptoms other than stated above. Current Medications:    Current Outpatient Medications:     lidocaine (XYLOCAINE) 5 % ointment, APPLY TWO GRAMS EXTERNALLY TO PAIN AREA FOUR TIMES A DAY.  MAX OF 8 GRAMS PER DAY , Disp: 212.64 g, Rfl: 0    Cream Base (VERSAPRO) CREA, APPLY ONE GRAM (ONE PUMP) EXTERNALLY FOUR TIMES A DAY , Disp: 100 g, Rfl: 0    lidocaine-prilocaine (EMLA) 2.5-2.5 % cream, APPLY ONE GRAM EXTERNALLY THREE TIMES A DAY IF NEEDED-MUST LAST 30 DAYS , Disp: 30 g, Rfl: 0    acetaminophen (TYLENOL 8 HOUR) 650 MG extended release tablet, Take 650 mg by mouth 3 times daily , Disp: , Rfl:     amLODIPine (NORVASC) 5 MG tablet, Take 1 tablet by mouth 2 times daily, Disp: 180 tablet, Rfl: 1    hydrochlorothiazide (HYDRODIURIL) 12.5 MG tablet, Take 1 tablet by mouth daily, Disp: 90 tablet, Rfl: 1    lisinopril (PRINIVIL;ZESTRIL) 20 MG tablet, Take 1 tablet by mouth daily, Disp: 90 tablet, Rfl: 1    metoprolol succinate (TOPROL XL) 50 MG extended release tablet, Take 1 tablet by mouth daily, Disp: 90 tablet, Rfl: 1    potassium chloride (MICRO-K) 10 MEQ extended release capsule, 3 po daily, Disp: 270 capsule, Rfl: 3    rosuvastatin (CRESTOR) 20 MG tablet, Take 1 tablet by mouth daily, Disp: 90 tablet, Rfl: 1    predniSONE (DELTASONE) 5 MG tablet, TAKE 1 TABLET BY MOUTH ONCE DAILY, Disp: , Rfl: 0    BD PEN NEEDLE RISA U/F 32G X 4 MM MISC, , Disp: , Rfl:     Glucosamine-Chondroitin 2106-6621 MG/30ML LIQD, Take by mouth, Disp: , Rfl:     insulin lispro (HUMALOG KWIKPEN) 200 UNIT/ML SOPN pen, 14units q am, Disp: 3 pen, Rfl: 1    insulin glargine (BASAGLAR KWIKPEN) 100 UNIT/ML injection pen, Inject 20 Units into the skin nightly, Disp: 5 pen, Rfl: 1    diclofenac sodium 1 % GEL, Apply pea size to right shoulder three times per day as needd, Disp: , Rfl:     Probiotic Product (VSL#3 PO), Take 1 tablet by mouth 2 times daily, Disp: , Rfl:     vitamin D 1000 UNITS CAPS, Take 1 capsule by mouth 2 times daily, Disp: , Rfl:     Coenzyme Q10 (CO Q 10) 10 MG CAPS, Take by mouth, Disp: , Rfl:     Calcium Carbonate-Vit D-Min (CALCIUM 1200 PO), Take by mouth, Disp: , Rfl:     Allergies: Allergies   Allergen Reactions    Iv Dye [Iodides] Hives    Pcn [Penicillins] Hives    Relafen [Nabumetone] Hives    Niaspan [Niacin Er]      Night sweats       Vitals:    11/04/20 1422   Weight: 150 lb (68 kg)   Height: 5' 3\" (1.6 m)       Exam:  Neurovascular status unchanged. Corn/callus area stable plantar aspect right great toe. Small superficial opening noted measuring approximately 0.2 cm in diameter. No undermining noted. No purulence, odor, erythema or any signs of infection noted right great toe. Diagnostic Studies:     No results found. Procedures:    None    Plan Per Assessment  Arsenio was seen today for callouses and foot pain. Diagnoses and all orders for this visit:    Foot drop, right foot    Corns and callus    Type II diabetes mellitus with peripheral circulatory disorder ([facility] [address]      1. Evaluation and management  2. We did discuss appropriate care to the corn/callus/open area right great toe.   Patient is to apply Hydrofera Blue to the area followed by dry dressing to be worn throughout the day. She is to leave the area open to air at nighttime with a clean sock overlying and good supportive shoe gear. We did dispense an additional toe crest pad to offload the plantar right great toe with ambulatory activities. 3. Patient was to continue with her AFO device and current shoe gear. We did discuss additional diabetic foot care techniques with patient today. 4. Patient will be followed up at a later date for continued evaluation and management. She was advised to call the office with any questions or concerns in the interim. Seen By:    Won Pendleton DPM    Electronically signed by Won Pendleton DPM on 11/4/2020 at 2:45 PM    This note was created using voice recognition software. The note was reviewed however may contain grammatical errors.

## 2020-11-11 ENCOUNTER — OFFICE VISIT (OUTPATIENT)
Dept: PODIATRY | Age: 85
End: 2020-11-11
Payer: MEDICARE

## 2020-11-11 VITALS — TEMPERATURE: 98 F

## 2020-11-11 PROCEDURE — 4040F PNEUMOC VAC/ADMIN/RCVD: CPT | Performed by: PODIATRIST

## 2020-11-11 PROCEDURE — G8484 FLU IMMUNIZE NO ADMIN: HCPCS | Performed by: PODIATRIST

## 2020-11-11 PROCEDURE — 1090F PRES/ABSN URINE INCON ASSESS: CPT | Performed by: PODIATRIST

## 2020-11-11 PROCEDURE — G8427 DOCREV CUR MEDS BY ELIG CLIN: HCPCS | Performed by: PODIATRIST

## 2020-11-11 PROCEDURE — 3051F HG A1C>EQUAL 7.0%<8.0%: CPT | Performed by: PODIATRIST

## 2020-11-11 PROCEDURE — 1123F ACP DISCUSS/DSCN MKR DOCD: CPT | Performed by: PODIATRIST

## 2020-11-11 PROCEDURE — 1036F TOBACCO NON-USER: CPT | Performed by: PODIATRIST

## 2020-11-11 PROCEDURE — 99213 OFFICE O/P EST LOW 20 MIN: CPT | Performed by: PODIATRIST

## 2020-11-11 PROCEDURE — G8417 CALC BMI ABV UP PARAM F/U: HCPCS | Performed by: PODIATRIST

## 2020-11-11 NOTE — PROGRESS NOTES
Patient is here today for follow up evaluation of painful callus great right toe. She states it is feeling much better than it was last week.

## 2020-11-11 NOTE — PROGRESS NOTES
20     Val White    : 1928   Sex: female    Age: 80 y.o. Patient's PCP/Provider is:  Lynda Malagon MD    Subjective:  Patient is seen today for follow-up regarding continued care corn/callus plantar aspect right great toe. Patient has been applying the topical dressings as instructed to allow for continued healing. She has noticed significant improvement in pain symptoms. She has been wearing the dropfoot brace as instructed and utilizing the toe crest pad dispensed last visit as well. No other additional abnormalities noted at this time. Chief Complaint   Patient presents with    Callouses       ROS:  Const: Positives and pertinent negatives as per HPI. Musculo: Denies symptoms other than stated above. Neuro: Denies symptoms other than stated above. Skin: Denies symptoms other than stated above. Current Medications:    Current Outpatient Medications:     lidocaine (XYLOCAINE) 5 % ointment, APPLY TWO GRAMS EXTERNALLY TO PAIN AREA FOUR TIMES A DAY.  MAX OF 8 GRAMS PER DAY , Disp: 212.64 g, Rfl: 0    Cream Base (VERSAPRO) CREA, APPLY ONE GRAM (ONE PUMP) EXTERNALLY FOUR TIMES A DAY , Disp: 100 g, Rfl: 0    lidocaine-prilocaine (EMLA) 2.5-2.5 % cream, APPLY ONE GRAM EXTERNALLY THREE TIMES A DAY IF NEEDED-MUST LAST 30 DAYS , Disp: 30 g, Rfl: 0    acetaminophen (TYLENOL 8 HOUR) 650 MG extended release tablet, Take 650 mg by mouth 3 times daily , Disp: , Rfl:     amLODIPine (NORVASC) 5 MG tablet, Take 1 tablet by mouth 2 times daily, Disp: 180 tablet, Rfl: 1    hydrochlorothiazide (HYDRODIURIL) 12.5 MG tablet, Take 1 tablet by mouth daily, Disp: 90 tablet, Rfl: 1    lisinopril (PRINIVIL;ZESTRIL) 20 MG tablet, Take 1 tablet by mouth daily, Disp: 90 tablet, Rfl: 1    metoprolol succinate (TOPROL XL) 50 MG extended release tablet, Take 1 tablet by mouth daily, Disp: 90 tablet, Rfl: 1    potassium chloride (MICRO-K) 10 MEQ extended release capsule, 3 po daily, Disp: 270 continued use of the dropfoot brace and offloading toe crest pad as instructed. It was discussed in detail with the patient proper hygiene for the diabetic foot. They are to get in the habit of looking at their feet or have someone look at them. If they are unable to do daily, they are to look for any signs of redness, blistering, cracking, swelling, drainage, open lesions, etc.  They are to dry in between the toes after each bath or shower gently. The water should be tested with the elbow to prevent burns. The patient is to refrain from soaking their feet unless instructed by myself to do otherwise. They are to refrain from going barefoot. Shoe gear should be inspected for any foreign objects. Shoes should have a deep wide toe box. With any type of shoe, the feet should be inspected for any signs of pressure, i.e. redness, blistering, or open sores. Further instructional guidelines were dispensed to the patient. Patient will be followed up at a later date for continued evaluation and management. Seen By:    Lakisha Gamble DPM    Electronically signed by Lakisha Gamble DPM on 11/11/2020 at 10:35 AM    This note was created using voice recognition software. The note was reviewed however may contain grammatical errors.

## 2021-01-25 LAB
C-REACTIVE PROTEIN: ABNORMAL MG/DL (ref 0–0.4)
SEDIMENTATION RATE, ERYTHROCYTE: ABNORMAL MM/HR (ref 0–20)

## 2021-01-27 ENCOUNTER — TELEPHONE (OUTPATIENT)
Dept: PODIATRY | Age: 86
End: 2021-01-27

## 2021-01-27 NOTE — TELEPHONE ENCOUNTER
Patient would like an appointment with Dr. Christopher Jaramillo for herself and her  at the same time. Feb 8/10 are open and she will contact me back when she finds a ride.

## 2021-02-08 ENCOUNTER — OFFICE VISIT (OUTPATIENT)
Dept: PODIATRY | Age: 86
End: 2021-02-08
Payer: MEDICARE

## 2021-02-08 VITALS — HEIGHT: 62 IN | WEIGHT: 154 LBS | BODY MASS INDEX: 28.34 KG/M2

## 2021-02-08 DIAGNOSIS — I73.9 PVD (PERIPHERAL VASCULAR DISEASE) (HCC): ICD-10-CM

## 2021-02-08 DIAGNOSIS — M79.675 PAIN OF TOE OF LEFT FOOT: ICD-10-CM

## 2021-02-08 DIAGNOSIS — B35.1 ONYCHOMYCOSIS: Primary | ICD-10-CM

## 2021-02-08 DIAGNOSIS — E11.51 TYPE II DIABETES MELLITUS WITH PERIPHERAL CIRCULATORY DISORDER (HCC): ICD-10-CM

## 2021-02-08 DIAGNOSIS — R26.2 DIFFICULTY WALKING: ICD-10-CM

## 2021-02-08 DIAGNOSIS — M21.371 FOOT DROP, RIGHT FOOT: ICD-10-CM

## 2021-02-08 DIAGNOSIS — M79.674 PAIN OF TOE OF RIGHT FOOT: ICD-10-CM

## 2021-02-08 PROCEDURE — G8484 FLU IMMUNIZE NO ADMIN: HCPCS | Performed by: PODIATRIST

## 2021-02-08 PROCEDURE — G8417 CALC BMI ABV UP PARAM F/U: HCPCS | Performed by: PODIATRIST

## 2021-02-08 PROCEDURE — 1123F ACP DISCUSS/DSCN MKR DOCD: CPT | Performed by: PODIATRIST

## 2021-02-08 PROCEDURE — 11721 DEBRIDE NAIL 6 OR MORE: CPT | Performed by: PODIATRIST

## 2021-02-08 PROCEDURE — G8427 DOCREV CUR MEDS BY ELIG CLIN: HCPCS | Performed by: PODIATRIST

## 2021-02-08 PROCEDURE — 1036F TOBACCO NON-USER: CPT | Performed by: PODIATRIST

## 2021-02-08 PROCEDURE — 99212 OFFICE O/P EST SF 10 MIN: CPT | Performed by: PODIATRIST

## 2021-02-08 PROCEDURE — 1090F PRES/ABSN URINE INCON ASSESS: CPT | Performed by: PODIATRIST

## 2021-02-08 PROCEDURE — 4040F PNEUMOC VAC/ADMIN/RCVD: CPT | Performed by: PODIATRIST

## 2021-02-08 NOTE — PROGRESS NOTES
Patient here for a 1 month follow up on the right great toe. Patient states that she was wearing a brace but states that it was not helping her toe and causing pain. Patient states that the callus's on the bottom of her foot hurts.  Last ov with pcp was 08/28/2020 with Joleen Ramirez MD            Electronically signed by Millie Donaldson MA on 2/8/2021 at 3:50 PM

## 2021-02-09 NOTE — PROGRESS NOTES
  insulin glargine (BASAGLAR KWIKPEN) 100 UNIT/ML injection pen, Inject 20 Units into the skin nightly, Disp: 5 pen, Rfl: 1    diclofenac sodium 1 % GEL, Apply pea size to right shoulder three times per day as needd, Disp: , Rfl:     Probiotic Product (VSL#3 PO), Take 1 tablet by mouth 2 times daily, Disp: , Rfl:     vitamin D 1000 UNITS CAPS, Take 1 capsule by mouth 2 times daily, Disp: , Rfl:     Coenzyme Q10 (CO Q 10) 10 MG CAPS, Take by mouth, Disp: , Rfl:     Calcium Carbonate-Vit D-Min (CALCIUM 1200 PO), Take by mouth, Disp: , Rfl:     Allergies:   Allergies   Allergen Reactions    Iv Dye [Iodides] Hives    Pcn [Penicillins] Hives    Relafen [Nabumetone] Hives    Niaspan [Niacin Er]      Night sweats       Past Surgical History:   Procedure Laterality Date    ANESTHESIA NERVE BLOCK Right 6/18/2020    RIGHT SACROILIAC JOINT INJECTION UNDER FLUOROSCOPY performed by Keli Cuellar MD at [place] [address] Left 10/2012    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 Ko Levels will be having MOH's     WRIST FRACTURE SURGERY Bilateral 02/2015    while in [place]      Past Medical History:   Diagnosis Date    Anxiety     Chronic kidney disease     Decreased bone density     Foot drop, right foot     Hyperlipidemia     Hypertension     Intervertebral disc disorder     Lumbar stenosis     Nontoxic multinodular goiter     Osteoarthritis     Osteopenia     Polymyalgia rheumatica (HCC)     TIA (transient ischemic attack) 2005    Type 2 diabetes mellitus ([facility] [address]     Vitamin D deficiency        Vitals:    02/08/21 1550   Weight: 154 lb (69.9 kg)   Height: 5' 2\" (1.575 m)       Exam: Neurovascular status unchanged. At this time the nail/s 1, 2, 3, 4, 5 right foot and nail/s 1, 2, 3, 4, 5 left foot are noted to be thickened, dystrophic and discolored with subungual debris present. Tenderness noted to palpation. Diminished hair growth is noted to both lower extremities. Edema noted with both varicosities and stasis skin changes present bilaterally. Coolness is noted to the digital regions to palpation. Capillary fill time delayed digital areas bilateral foot. No heel fissuring or macerations of the web spaces. No plantar calluses and/or ulcerative areas are noted. Dropfoot deformity noted right lower extremity with mild skin abrasion right great toe. No signs of infection noted right great toe. Patient is having difficulty with gait/walking. Plan Per Assessment  Jose L Colvin was seen today for toe pain. Diagnoses and all orders for this visit:    Onychomycosis    Pain of toe of right foot    Pain of toe of left foot    PVD (peripheral vascular disease) ([address]    Type II diabetes mellitus with peripheral circulatory disorder (HCC)    Foot drop, right foot    Difficulty walking        1. Evaluation and Management  2. Manual and electrical debridement of the mycotic nails was performed for thickness and length to prevent injection and/or ulceration. 3. I recommended antifungal cream to the nails daily. 4. It was discussed in detail with the patient proper caring for the vascular compromised foot. The fact that they have compromised blood flow put the patient at risk for infection/gangrene/amputation. The patient should not walk barefoot. Shoe gear should fit properly and socks should be worn with shoes. Exercise is very important to prevent worsening of the disease process but before performing an exercise program should check with their family physician first.  If any skin lesions are noted, they are instructed to contact the office immediately. 5. It was discussed in detail with the patient proper hygiene for the diabetic foot. They are to get in the habit of looking at their feet or have someone look at them. If they are unable to do daily, they are to look for any signs of redness, blistering, cracking, swelling, drainage, open lesions, etc.  They are to dry in between the toes after each bath or shower gently. The water should be tested with the elbow to prevent burns. The patient is to refrain from soaking their feet unless instructed by myself to do otherwise. They are to refrain from going barefoot. Shoe gear should be inspected for any foreign objects. Shoes should have a deep wide toe box. With any type of shoe, the feet should be inspected for any signs of pressure, i.e. redness, blistering, or open sores. Further instructional guidelines were dispensed to the patient. 6. We will see the patient back at a later date for continued podiatric management and care. Patient was advised to call the office with any questions or concerns prior to their next appointment if needed. Seen By:    Jerrell Wilson DPM    Electronically signed by Jerrell Wilson DPM on 2/9/2021 at 8:38 AM      This note was created using voice recognition software. The note was reviewed however may contain grammatical errors.

## 2021-04-07 DIAGNOSIS — M21.371 FOOT DROP, RIGHT FOOT: Primary | ICD-10-CM

## 2021-04-07 DIAGNOSIS — R26.2 DIFFICULTY WALKING: ICD-10-CM

## 2021-04-19 ENCOUNTER — PROCEDURE VISIT (OUTPATIENT)
Dept: PODIATRY | Age: 86
End: 2021-04-19
Payer: MEDICARE

## 2021-04-19 VITALS — WEIGHT: 154 LBS | HEIGHT: 63 IN | BODY MASS INDEX: 27.29 KG/M2

## 2021-04-19 DIAGNOSIS — B35.1 ONYCHOMYCOSIS: Primary | ICD-10-CM

## 2021-04-19 DIAGNOSIS — M21.371 FOOT DROP, RIGHT FOOT: ICD-10-CM

## 2021-04-19 DIAGNOSIS — E11.51 TYPE II DIABETES MELLITUS WITH PERIPHERAL CIRCULATORY DISORDER (HCC): ICD-10-CM

## 2021-04-19 DIAGNOSIS — M79.675 PAIN OF TOE OF LEFT FOOT: ICD-10-CM

## 2021-04-19 DIAGNOSIS — I73.9 PVD (PERIPHERAL VASCULAR DISEASE) (HCC): ICD-10-CM

## 2021-04-19 DIAGNOSIS — M79.674 PAIN OF TOE OF RIGHT FOOT: ICD-10-CM

## 2021-04-19 DIAGNOSIS — R26.2 DIFFICULTY WALKING: ICD-10-CM

## 2021-04-19 PROCEDURE — 1123F ACP DISCUSS/DSCN MKR DOCD: CPT | Performed by: PODIATRIST

## 2021-04-19 PROCEDURE — 4040F PNEUMOC VAC/ADMIN/RCVD: CPT | Performed by: PODIATRIST

## 2021-04-19 PROCEDURE — 99212 OFFICE O/P EST SF 10 MIN: CPT | Performed by: PODIATRIST

## 2021-04-19 PROCEDURE — 1036F TOBACCO NON-USER: CPT | Performed by: PODIATRIST

## 2021-04-19 PROCEDURE — G8427 DOCREV CUR MEDS BY ELIG CLIN: HCPCS | Performed by: PODIATRIST

## 2021-04-19 PROCEDURE — 11721 DEBRIDE NAIL 6 OR MORE: CPT | Performed by: PODIATRIST

## 2021-04-19 PROCEDURE — G8417 CALC BMI ABV UP PARAM F/U: HCPCS | Performed by: PODIATRIST

## 2021-04-19 PROCEDURE — 1090F PRES/ABSN URINE INCON ASSESS: CPT | Performed by: PODIATRIST

## 2021-04-19 NOTE — PROGRESS NOTES
21     Nemesio Redmond    : 1928  Sex: female  Age: 80 y.o. Subjective: The patient is seen today for evaluation regarding diabetic foot evaluation, mycotic nail care and issues regarding her dropfoot deformity right lower extremity. No other complaints noted. Chief Complaint   Patient presents with    Nail Problem    Callouses       Current Medications:    Current Outpatient Medications:     lidocaine (XYLOCAINE) 5 % ointment, APPLY TWO GRAMS EXTERNALLY TO PAIN AREA FOUR TIMES A DAY.  MAX OF 8 GRAMS PER DAY , Disp: 212.64 g, Rfl: 0    Cream Base (VERSAPRO) CREA, APPLY ONE GRAM (ONE PUMP) EXTERNALLY FOUR TIMES A DAY , Disp: 100 g, Rfl: 0    lidocaine-prilocaine (EMLA) 2.5-2.5 % cream, APPLY ONE GRAM EXTERNALLY THREE TIMES A DAY IF NEEDED-MUST LAST 30 DAYS , Disp: 30 g, Rfl: 0    acetaminophen (TYLENOL 8 HOUR) 650 MG extended release tablet, Take 650 mg by mouth 3 times daily , Disp: , Rfl:     amLODIPine (NORVASC) 5 MG tablet, Take 1 tablet by mouth 2 times daily, Disp: 180 tablet, Rfl: 1    hydrochlorothiazide (HYDRODIURIL) 12.5 MG tablet, Take 1 tablet by mouth daily, Disp: 90 tablet, Rfl: 1    lisinopril (PRINIVIL;ZESTRIL) 20 MG tablet, Take 1 tablet by mouth daily, Disp: 90 tablet, Rfl: 1    metoprolol succinate (TOPROL XL) 50 MG extended release tablet, Take 1 tablet by mouth daily, Disp: 90 tablet, Rfl: 1    potassium chloride (MICRO-K) 10 MEQ extended release capsule, 3 po daily, Disp: 270 capsule, Rfl: 3    rosuvastatin (CRESTOR) 20 MG tablet, Take 1 tablet by mouth daily, Disp: 90 tablet, Rfl: 1    predniSONE (DELTASONE) 5 MG tablet, TAKE 1 TABLET BY MOUTH ONCE DAILY, Disp: , Rfl: 0    BD PEN NEEDLE RISA U/F 32G X 4 MM MISC, , Disp: , Rfl:     Glucosamine-Chondroitin 3825-8883 MG/30ML LIQD, Take by mouth, Disp: , Rfl:     insulin lispro (HUMALOG KWIKPEN) 200 UNIT/ML SOPN pen, 14units q am, Disp: 3 pen, Rfl: 1    insulin glargine (BASAGLAR KWIKPEN) 100 UNIT/ML varicosities and stasis skin changes present bilaterally. Coolness is noted to the digital regions to palpation. Capillary fill time delayed digital areas bilateral foot. No heel fissuring or macerations of the web spaces. Mild callus noted plantar right great toe, no ulcerative areas are noted. Dropfoot issue is stable at this time with minimal callus noted right great toe. Patient is having difficulty with gait/walking. Plan Per Assessment  Joe Wright was seen today for nail problem and callouses. Diagnoses and all orders for this visit:    Onychomycosis    Pain of toe of right foot    Pain of toe of left foot    PVD (peripheral vascular disease) ([facility] [address]    Type II diabetes mellitus with peripheral circulatory disorder (HCC)    Foot drop, right foot    Difficulty walking        1. Evaluation and Management  2. Manual and electrical debridement of the mycotic nails was performed for thickness and length to prevent injection and/or ulceration. 3. I recommended antifungal cream to the nails daily. 4. It was discussed in detail with the patient proper caring for the vascular compromised foot. The fact that they have compromised blood flow put the patient at risk for infection/gangrene/amputation. The patient should not walk barefoot. Shoe gear should fit properly and socks should be worn with shoes. Exercise is very important to prevent worsening of the disease process but before performing an exercise program should check with their family physician first.  If any skin lesions are noted, they are instructed to contact the office immediately. 5.  Patient is to continue with the padding and protection to the right great toe until she is dispensed her new dropfoot brace next week. We did recommend continued use of her good supportive shoe gear with all ambulatory activities. 6. We will see the patient back at a later date for continued podiatric management and care.   Patient was advised to call the office with any questions or concerns prior to their next appointment if needed. Seen By:    Riri Savage DPM    Electronically signed by Riri Savage DPM on 4/19/2021 at 1:47 PM      This note was created using voice recognition software. The note was reviewed however may contain grammatical errors.

## 2021-05-26 ENCOUNTER — OFFICE VISIT (OUTPATIENT)
Dept: PODIATRY | Age: 86
End: 2021-05-26
Payer: MEDICARE

## 2021-05-26 DIAGNOSIS — M21.371 FOOT DROP, RIGHT FOOT: Primary | ICD-10-CM

## 2021-05-26 DIAGNOSIS — L84 CORNS AND CALLUS: ICD-10-CM

## 2021-05-26 DIAGNOSIS — I73.9 PVD (PERIPHERAL VASCULAR DISEASE) (HCC): ICD-10-CM

## 2021-05-26 PROCEDURE — G8417 CALC BMI ABV UP PARAM F/U: HCPCS | Performed by: PODIATRIST

## 2021-05-26 PROCEDURE — 1090F PRES/ABSN URINE INCON ASSESS: CPT | Performed by: PODIATRIST

## 2021-05-26 PROCEDURE — 99213 OFFICE O/P EST LOW 20 MIN: CPT | Performed by: PODIATRIST

## 2021-05-26 PROCEDURE — 1123F ACP DISCUSS/DSCN MKR DOCD: CPT | Performed by: PODIATRIST

## 2021-05-26 PROCEDURE — G8427 DOCREV CUR MEDS BY ELIG CLIN: HCPCS | Performed by: PODIATRIST

## 2021-05-26 PROCEDURE — 4040F PNEUMOC VAC/ADMIN/RCVD: CPT | Performed by: PODIATRIST

## 2021-05-26 PROCEDURE — 1036F TOBACCO NON-USER: CPT | Performed by: PODIATRIST

## 2021-05-26 NOTE — PROGRESS NOTES
Patient has a callous on her toe. She has a brace she obtained from [employer]. They told her she needed doctor Burns to see her to reevaluate.     pcp is Chaifoster Shannon MD  LOV    Electronically signed by Radhames Robles LPN on 8/31/6420 at 72:59 AM

## 2021-05-27 NOTE — PROGRESS NOTES
21     Bridget Morin    : 1928   Sex: female    Age: 80 y.o. Patient's PCP/Provider is:  Rajan Torres MD    Subjective:  Patient is seen today for follow-up regarding continued evaluation regarding foot drop issues right lower extremity. Patient did receive her new dropfoot brace with recent adjustment made. She was still complaining of some discomfort due to a callus distal aspect right great toe. Patient denies any nausea, vomiting, fever, chills. No other additional abnormalities noted at this time. Chief Complaint   Patient presents with    Foot Pain     pt is following up with a brace she obtained from [place] [id number]- it is causing pain on her toe would like looked at       ROS:  Const: Positives and pertinent negatives as per HPI. Musculo: Denies symptoms other than stated above. Neuro: Denies symptoms other than stated above. Skin: Denies symptoms other than stated above. Current Medications:    Current Outpatient Medications:     lidocaine (XYLOCAINE) 5 % ointment, APPLY TWO GRAMS EXTERNALLY TO PAIN AREA FOUR TIMES A DAY.  MAX OF 8 GRAMS PER DAY , Disp: 212.64 g, Rfl: 0    Cream Base (VERSAPRO) CREA, APPLY ONE GRAM (ONE PUMP) EXTERNALLY FOUR TIMES A DAY , Disp: 100 g, Rfl: 0    lidocaine-prilocaine (EMLA) 2.5-2.5 % cream, APPLY ONE GRAM EXTERNALLY THREE TIMES A DAY IF NEEDED-MUST LAST 30 DAYS , Disp: 30 g, Rfl: 0    acetaminophen (TYLENOL 8 HOUR) 650 MG extended release tablet, Take 650 mg by mouth 3 times daily , Disp: , Rfl:     amLODIPine (NORVASC) 5 MG tablet, Take 1 tablet by mouth 2 times daily, Disp: 180 tablet, Rfl: 1    hydrochlorothiazide (HYDRODIURIL) 12.5 MG tablet, Take 1 tablet by mouth daily, Disp: 90 tablet, Rfl: 1    lisinopril (PRINIVIL;ZESTRIL) 20 MG tablet, Take 1 tablet by mouth daily, Disp: 90 tablet, Rfl: 1    metoprolol succinate (TOPROL XL) 50 MG extended release tablet, Take 1 tablet by mouth daily, Disp: 90 tablet, Rfl: 1    potassium chloride (MICRO-K) 10 MEQ extended release capsule, 3 po daily, Disp: 270 capsule, Rfl: 3    rosuvastatin (CRESTOR) 20 MG tablet, Take 1 tablet by mouth daily, Disp: 90 tablet, Rfl: 1    predniSONE (DELTASONE) 5 MG tablet, TAKE 1 TABLET BY MOUTH ONCE DAILY, Disp: , Rfl: 0    BD PEN NEEDLE RISA U/F 32G X 4 MM MISC, , Disp: , Rfl:     Glucosamine-Chondroitin 8599-1970 MG/30ML LIQD, Take by mouth, Disp: , Rfl:     insulin lispro (HUMALOG KWIKPEN) 200 UNIT/ML SOPN pen, 14units q am, Disp: 3 pen, Rfl: 1    insulin glargine (BASAGLAR KWIKPEN) 100 UNIT/ML injection pen, Inject 20 Units into the skin nightly, Disp: 5 pen, Rfl: 1    diclofenac sodium 1 % GEL, Apply pea size to right shoulder three times per day as needd, Disp: , Rfl:     Probiotic Product (VSL#3 PO), Take 1 tablet by mouth 2 times daily, Disp: , Rfl:     vitamin D 1000 UNITS CAPS, Take 1 capsule by mouth 2 times daily, Disp: , Rfl:     Coenzyme Q10 (CO Q 10) 10 MG CAPS, Take by mouth, Disp: , Rfl:     Calcium Carbonate-Vit D-Min (CALCIUM 1200 PO), Take by mouth, Disp: , Rfl:     Allergies: Allergies   Allergen Reactions    Iv Dye [Iodides] Hives    Pcn [Penicillins] Hives    Relafen [Nabumetone] Hives    Niaspan [Niacin Er]      Night sweats       There were no vitals filed for this visit. Exam:  Neurovascular status unchanged. Hyperkeratotic area noted distal plantar aspect of the right great toe. Upon partial thickness, nonexcisional debridement with a #15 blade and tissue nipper no underlying ulceration noted. No signs of infection noted right great toe. No edema or ecchymotic skin changes present right great toe. Diagnostic Studies:     No results found. Procedures:    None    Plan Per Assessment  Jovani Orantes was seen today for foot pain. Diagnoses and all orders for this visit:    Foot drop, right foot    Corns and callus    PVD (peripheral vascular disease) ([facility] [address]      1. Evaluation and management  2.  We did evaluate the new dropfoot brace and adjustments made which is working better for patient today. 3. Partial-thickness debridement hyperkeratotic area was performed right great toe to patient tolerance. We did recommend continued use of the topical moisturizing cream to the digital regions to allow for continued improvement. 4. Patient will be followed off her regular scheduled foot care appointment or sooner if needed. No further adjustments need to be made to the dropfoot brace. Seen By:    Christen Campuzano DPM    Electronically signed by Christen Campuzano DPM on 5/27/2021 at 11:06 AM    This note was created using voice recognition software. The note was reviewed however may contain grammatical errors.

## 2021-06-28 ENCOUNTER — PROCEDURE VISIT (OUTPATIENT)
Dept: PODIATRY | Age: 86
End: 2021-06-28
Payer: MEDICARE

## 2021-06-28 VITALS — HEIGHT: 63 IN | BODY MASS INDEX: 27.29 KG/M2 | WEIGHT: 154 LBS

## 2021-06-28 DIAGNOSIS — B35.1 ONYCHOMYCOSIS: Primary | ICD-10-CM

## 2021-06-28 DIAGNOSIS — E11.51 TYPE II DIABETES MELLITUS WITH PERIPHERAL CIRCULATORY DISORDER (HCC): ICD-10-CM

## 2021-06-28 DIAGNOSIS — M79.675 PAIN OF TOE OF LEFT FOOT: ICD-10-CM

## 2021-06-28 DIAGNOSIS — M79.674 PAIN OF TOE OF RIGHT FOOT: ICD-10-CM

## 2021-06-28 DIAGNOSIS — R26.2 DIFFICULTY WALKING: ICD-10-CM

## 2021-06-28 DIAGNOSIS — I87.2 VENOUS INSUFFICIENCY (CHRONIC) (PERIPHERAL): ICD-10-CM

## 2021-06-28 PROCEDURE — 11721 DEBRIDE NAIL 6 OR MORE: CPT | Performed by: PODIATRIST

## 2021-06-28 NOTE — PROGRESS NOTES
21     Bridget Morin    : 1928  Sex: female  Age: 80 y.o. Subjective: The patient is seen today for evaluation regarding diabetic foot evaluation and mycotic nail care. No other complaints noted. Chief Complaint   Patient presents with    Nail Problem       Current Medications:    Current Outpatient Medications:     lidocaine (XYLOCAINE) 5 % ointment, APPLY TWO GRAMS EXTERNALLY TO PAIN AREA FOUR TIMES A DAY.  MAX OF 8 GRAMS PER DAY , Disp: 212.64 g, Rfl: 0    Cream Base (VERSAPRO) CREA, APPLY ONE GRAM (ONE PUMP) EXTERNALLY FOUR TIMES A DAY , Disp: 100 g, Rfl: 0    lidocaine-prilocaine (EMLA) 2.5-2.5 % cream, APPLY ONE GRAM EXTERNALLY THREE TIMES A DAY IF NEEDED-MUST LAST 30 DAYS , Disp: 30 g, Rfl: 0    acetaminophen (TYLENOL 8 HOUR) 650 MG extended release tablet, Take 650 mg by mouth 3 times daily , Disp: , Rfl:     amLODIPine (NORVASC) 5 MG tablet, Take 1 tablet by mouth 2 times daily, Disp: 180 tablet, Rfl: 1    hydrochlorothiazide (HYDRODIURIL) 12.5 MG tablet, Take 1 tablet by mouth daily, Disp: 90 tablet, Rfl: 1    lisinopril (PRINIVIL;ZESTRIL) 20 MG tablet, Take 1 tablet by mouth daily, Disp: 90 tablet, Rfl: 1    metoprolol succinate (TOPROL XL) 50 MG extended release tablet, Take 1 tablet by mouth daily, Disp: 90 tablet, Rfl: 1    potassium chloride (MICRO-K) 10 MEQ extended release capsule, 3 po daily, Disp: 270 capsule, Rfl: 3    rosuvastatin (CRESTOR) 20 MG tablet, Take 1 tablet by mouth daily, Disp: 90 tablet, Rfl: 1    predniSONE (DELTASONE) 5 MG tablet, TAKE 1 TABLET BY MOUTH ONCE DAILY, Disp: , Rfl: 0    BD PEN NEEDLE RISA U/F 32G X 4 MM MISC, , Disp: , Rfl:     Glucosamine-Chondroitin 6441-2195 MG/30ML LIQD, Take by mouth, Disp: , Rfl:     insulin lispro (HUMALOG KWIKPEN) 200 UNIT/ML SOPN pen, 14units q am, Disp: 3 pen, Rfl: 1    insulin glargine (BASAGLAR KWIKPEN) 100 UNIT/ML injection pen, Inject 20 Units into the skin nightly, Disp: 5 pen, Rfl: 1   diclofenac sodium 1 % GEL, Apply pea size to right shoulder three times per day as needd, Disp: , Rfl:     Probiotic Product (VSL#3 PO), Take 1 tablet by mouth 2 times daily, Disp: , Rfl:     vitamin D 1000 UNITS CAPS, Take 1 capsule by mouth 2 times daily, Disp: , Rfl:     Coenzyme Q10 (CO Q 10) 10 MG CAPS, Take by mouth, Disp: , Rfl:     Calcium Carbonate-Vit D-Min (CALCIUM 1200 PO), Take by mouth, Disp: , Rfl:     Allergies: Allergies   Allergen Reactions    Iv Dye [Iodides] Hives    Pcn [Penicillins] Hives    Relafen [Nabumetone] Hives    Niaspan [Niacin Er]      Night sweats       Past Surgical History:   Procedure Laterality Date    ANESTHESIA NERVE BLOCK Right 6/18/2020    RIGHT SACROILIAC JOINT INJECTION UNDER FLUOROSCOPY performed by Jessica Stock MD at [place] [address] Left 10/2012    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 Juan Espinoza will be having MOH's     WRIST FRACTURE SURGERY Bilateral 02/2015    while in [place]      Past Medical History:   Diagnosis Date    Anxiety     Chronic kidney disease     Decreased bone density     Foot drop, right foot     Hyperlipidemia     Hypertension     Intervertebral disc disorder     Lumbar stenosis     Nontoxic multinodular goiter     Osteoarthritis     Osteopenia     Polymyalgia rheumatica (HCC)     TIA (transient ischemic attack) 2005    Type 2 diabetes mellitus ([facility] [address]     Vitamin D deficiency        Vitals:    06/28/21 1458   Weight: 154 lb (69.9 kg)   Height: 5' 3\" (1.6 m)       Exam:  Neurovascular status unchanged. At this time the nail/s 1, 2, 3, 4, 5 right foot and nail/s 1, 2, 3, 4, 5 left foot are noted to be thickened, dystrophic and discolored with subungual debris present. Tenderness noted to palpation. Minimal hair growth is noted to both lower extremities. Noted with both varicosities and stasis skin changes present bilaterally.   Coolness is noted to the

## 2021-06-28 NOTE — PROGRESS NOTES
Patient in today for nail care. Patient does not have any complaints of pain at this time.  Patient's PCP is Marlo Jackson MD date of last ov  4/22/2021       Willie Hannah LPN

## 2021-11-29 ENCOUNTER — PROCEDURE VISIT (OUTPATIENT)
Dept: PODIATRY | Age: 86
End: 2021-11-29
Payer: MEDICARE

## 2021-11-29 VITALS — HEIGHT: 62 IN | WEIGHT: 154 LBS | BODY MASS INDEX: 28.34 KG/M2

## 2021-11-29 DIAGNOSIS — E11.51 TYPE II DIABETES MELLITUS WITH PERIPHERAL CIRCULATORY DISORDER (HCC): ICD-10-CM

## 2021-11-29 DIAGNOSIS — I87.2 VENOUS INSUFFICIENCY (CHRONIC) (PERIPHERAL): ICD-10-CM

## 2021-11-29 DIAGNOSIS — R26.2 DIFFICULTY WALKING: ICD-10-CM

## 2021-11-29 DIAGNOSIS — M79.675 PAIN OF TOE OF LEFT FOOT: ICD-10-CM

## 2021-11-29 DIAGNOSIS — B35.1 ONYCHOMYCOSIS: Primary | ICD-10-CM

## 2021-11-29 DIAGNOSIS — M79.674 PAIN OF TOE OF RIGHT FOOT: ICD-10-CM

## 2021-11-29 PROCEDURE — 11721 DEBRIDE NAIL 6 OR MORE: CPT | Performed by: PODIATRIST

## 2021-11-29 NOTE — PROGRESS NOTES
diclofenac sodium 1 % GEL, Apply pea size to right shoulder three times per day as needd, Disp: , Rfl:     Probiotic Product (VSL#3 PO), Take 1 tablet by mouth 2 times daily, Disp: , Rfl:     vitamin D 1000 UNITS CAPS, Take 1 capsule by mouth 2 times daily, Disp: , Rfl:     Coenzyme Q10 (CO Q 10) 10 MG CAPS, Take by mouth, Disp: , Rfl:     Calcium Carbonate-Vit D-Min (CALCIUM 1200 PO), Take by mouth, Disp: , Rfl:     Allergies: Allergies   Allergen Reactions    Iv Dye [Iodides] Hives    Pcn [Penicillins] Hives    Relafen [Nabumetone] Hives    Niaspan [Niacin Er]      Night sweats       Past Surgical History:   Procedure Laterality Date    ANESTHESIA NERVE BLOCK Right 6/18/2020    RIGHT SACROILIAC JOINT INJECTION UNDER FLUOROSCOPY performed by Sania Rodarte MD at [place] [id number] Left 10/2012    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 Rustam Toro will be having MOH's     WRIST FRACTURE SURGERY Bilateral 02/2015    while in [place]      Past Medical History:   Diagnosis Date    Anxiety     Chronic kidney disease     Decreased bone density     Foot drop, right foot     Hyperlipidemia     Hypertension     Intervertebral disc disorder     Lumbar stenosis     Nontoxic multinodular goiter     Osteoarthritis     Osteopenia     Polymyalgia rheumatica (HCC)     TIA (transient ischemic attack) 2005    Type 2 diabetes mellitus ([facility] [address]     Vitamin D deficiency        Vitals:    11/29/21 1147   Weight: 154 lb (69.9 kg)   Height: 5' 2\" (1.575 m)       Exam:  Pedal pulses diminished to palpation bilateral foot. Capillary refill time delayed digital regions bilateral foot. At this time the nail/s 1, 2, 3, 4, 5 right foot and nail/s 1, 2, 3, 4, 5 left foot are noted to be thickened, dystrophic and discolored with subungual debris present. Tenderness noted to palpation. Minimal hair growth is noted to both lower extremities.   Edema noted with both varicosities and stasis skin changes present bilaterally. Coolness is noted to the digital regions to palpation. Capillary fill time delayed digital areas bilateral foot. No heel fissuring or macerations of the web spaces. No plantar calluses and/or ulcerative areas are noted. Patient is having difficulty with gait/walking. Plan Per Assessment  Cami Tracy was seen today for nail problem. Diagnoses and all orders for this visit:    Onychomycosis    Pain of toe of right foot    Pain of toe of left foot    Type II diabetes mellitus with peripheral circulatory disorder (HCC)    Venous insufficiency (chronic) (peripheral)    Difficulty walking        1. Evaluation and Management  2. Manual and electrical debridement of the mycotic nails was performed for thickness and length to prevent injection and/or ulceration. 3. I recommended antifungal cream to the nails daily. 4. It was discussed in detail with the patient proper hygiene for the diabetic foot. They are to get in the habit of looking at their feet or have someone look at them. If they are unable to do daily, they are to look for any signs of redness, blistering, cracking, swelling, drainage, open lesions, etc.  They are to dry in between the toes after each bath or shower gently. The water should be tested with the elbow to prevent burns. The patient is to refrain from soaking their feet unless instructed by myself to do otherwise. They are to refrain from going barefoot. Shoe gear should be inspected for any foreign objects. Shoes should have a deep wide toe box. With any type of shoe, the feet should be inspected for any signs of pressure, i.e. redness, blistering, or open sores. Further instructional guidelines were dispensed to the patient. 5. We will see the patient back at a later date for continued podiatric management and care.   Patient was advised to call the office with any questions or concerns prior to their next appointment if needed. Seen By:    Honorio Beck DPM    Electronically signed by Honorio Beck DPM on 11/29/2021 at 12:17 PM      This note was created using voice recognition software. The note was reviewed however may contain grammatical errors.

## 2021-11-29 NOTE — PROGRESS NOTES
Patient in today for nail care. Patient does not have any complaints of pain at this time.  Patient's PCP is Effie Smith MD date of last ov 10/15/2021        Jos Lee LPN

## 2022-01-12 LAB
ALBUMIN SERPL-MCNC: 3.2 G/DL (ref 3.5–5.2)
ALP BLD-CCNC: 51 U/L (ref 35–104)
ALT SERPL-CCNC: 54 U/L (ref 0–32)
ANION GAP SERPL CALCULATED.3IONS-SCNC: 11 MMOL/L (ref 7–16)
AST SERPL-CCNC: 32 U/L (ref 0–31)
BASOPHILS ABSOLUTE: 0.04 E9/L (ref 0–0.2)
BASOPHILS RELATIVE PERCENT: 0.4 % (ref 0–2)
BILIRUB SERPL-MCNC: 0.5 MG/DL (ref 0–1.2)
BUN BLDV-MCNC: 41 MG/DL (ref 6–23)
CALCIUM SERPL-MCNC: 9.1 MG/DL (ref 8.6–10.2)
CHLORIDE BLD-SCNC: 100 MMOL/L (ref 98–107)
CHOLESTEROL, TOTAL: 138 MG/DL (ref 0–199)
CO2: 27 MMOL/L (ref 22–29)
CREAT SERPL-MCNC: 0.9 MG/DL (ref 0.5–1)
EOSINOPHILS ABSOLUTE: 0.11 E9/L (ref 0.05–0.5)
EOSINOPHILS RELATIVE PERCENT: 1 % (ref 0–6)
GFR AFRICAN AMERICAN: >60
GFR NON-AFRICAN AMERICAN: 58 ML/MIN/1.73
GLUCOSE BLD-MCNC: 133 MG/DL (ref 74–99)
HBA1C MFR BLD: 7.9 % (ref 4–5.6)
HCT VFR BLD CALC: 38.9 % (ref 34–48)
HDLC SERPL-MCNC: 46 MG/DL
HEMOGLOBIN: 13.1 G/DL (ref 11.5–15.5)
IMMATURE GRANULOCYTES #: 0.49 E9/L
IMMATURE GRANULOCYTES %: 4.6 % (ref 0–5)
LDL CHOLESTEROL CALCULATED: 65 MG/DL (ref 0–99)
LYMPHOCYTES ABSOLUTE: 2.77 E9/L (ref 1.5–4)
LYMPHOCYTES RELATIVE PERCENT: 26.2 % (ref 20–42)
MCH RBC QN AUTO: 30 PG (ref 26–35)
MCHC RBC AUTO-ENTMCNC: 33.7 % (ref 32–34.5)
MCV RBC AUTO: 89.2 FL (ref 80–99.9)
MONOCYTES ABSOLUTE: 1.04 E9/L (ref 0.1–0.95)
MONOCYTES RELATIVE PERCENT: 9.8 % (ref 2–12)
NEUTROPHILS ABSOLUTE: 6.12 E9/L (ref 1.8–7.3)
NEUTROPHILS RELATIVE PERCENT: 58 % (ref 43–80)
PDW BLD-RTO: 12.8 FL (ref 11.5–15)
PLATELET # BLD: 199 E9/L (ref 130–450)
PMV BLD AUTO: 10.4 FL (ref 7–12)
POTASSIUM SERPL-SCNC: 4.4 MMOL/L (ref 3.5–5)
RBC # BLD: 4.36 E12/L (ref 3.5–5.5)
SODIUM BLD-SCNC: 138 MMOL/L (ref 132–146)
TOTAL PROTEIN: 5.4 G/DL (ref 6.4–8.3)
TRIGL SERPL-MCNC: 133 MG/DL (ref 0–149)
VLDLC SERPL CALC-MCNC: 27 MG/DL
WBC # BLD: 10.6 E9/L (ref 4.5–11.5)

## 2022-01-19 LAB
BACTERIA: ABNORMAL /HPF
BILIRUBIN URINE: NEGATIVE
BLOOD, URINE: ABNORMAL
CLARITY: ABNORMAL
COLOR: YELLOW
GLUCOSE URINE: >=1000 MG/DL
KETONES, URINE: NEGATIVE MG/DL
LEUKOCYTE ESTERASE, URINE: ABNORMAL
NITRITE, URINE: NEGATIVE
PH UA: 5.5 (ref 5–9)
PROTEIN UA: ABNORMAL MG/DL
RBC UA: >20 /HPF (ref 0–2)
SPECIFIC GRAVITY UA: 1.02 (ref 1–1.03)
UROBILINOGEN, URINE: 0.2 E.U./DL
WBC UA: >20 /HPF (ref 0–5)

## 2022-01-20 LAB
ALBUMIN SERPL-MCNC: 3.3 G/DL (ref 3.5–5.2)
ALP BLD-CCNC: 60 U/L (ref 35–104)
ALT SERPL-CCNC: 33 U/L (ref 0–32)
AMMONIA: 18 UMOL/L (ref 11–51)
ANION GAP SERPL CALCULATED.3IONS-SCNC: 12 MMOL/L (ref 7–16)
AST SERPL-CCNC: 41 U/L (ref 0–31)
BASOPHILS ABSOLUTE: 0.03 E9/L (ref 0–0.2)
BASOPHILS RELATIVE PERCENT: 0.4 % (ref 0–2)
BILIRUB SERPL-MCNC: 0.9 MG/DL (ref 0–1.2)
BUN BLDV-MCNC: 18 MG/DL (ref 6–23)
CALCIUM SERPL-MCNC: 9.1 MG/DL (ref 8.6–10.2)
CHLORIDE BLD-SCNC: 101 MMOL/L (ref 98–107)
CO2: 23 MMOL/L (ref 22–29)
CREAT SERPL-MCNC: 0.9 MG/DL (ref 0.5–1)
EOSINOPHILS ABSOLUTE: 0.12 E9/L (ref 0.05–0.5)
EOSINOPHILS RELATIVE PERCENT: 1.8 % (ref 0–6)
GFR AFRICAN AMERICAN: >60
GFR NON-AFRICAN AMERICAN: 58 ML/MIN/1.73
GLUCOSE BLD-MCNC: 118 MG/DL (ref 74–99)
HCT VFR BLD CALC: 38.3 % (ref 34–48)
HEMOGLOBIN: 12.9 G/DL (ref 11.5–15.5)
IMMATURE GRANULOCYTES #: 0.05 E9/L
IMMATURE GRANULOCYTES %: 0.7 % (ref 0–5)
LYMPHOCYTES ABSOLUTE: 1.48 E9/L (ref 1.5–4)
LYMPHOCYTES RELATIVE PERCENT: 21.9 % (ref 20–42)
MCH RBC QN AUTO: 30.3 PG (ref 26–35)
MCHC RBC AUTO-ENTMCNC: 33.7 % (ref 32–34.5)
MCV RBC AUTO: 89.9 FL (ref 80–99.9)
MONOCYTES ABSOLUTE: 0.86 E9/L (ref 0.1–0.95)
MONOCYTES RELATIVE PERCENT: 12.7 % (ref 2–12)
NEUTROPHILS ABSOLUTE: 4.23 E9/L (ref 1.8–7.3)
NEUTROPHILS RELATIVE PERCENT: 62.5 % (ref 43–80)
PDW BLD-RTO: 13.2 FL (ref 11.5–15)
PLATELET # BLD: 117 E9/L (ref 130–450)
PMV BLD AUTO: 11.2 FL (ref 7–12)
POTASSIUM SERPL-SCNC: 3.5 MMOL/L (ref 3.5–5)
RBC # BLD: 4.26 E12/L (ref 3.5–5.5)
SODIUM BLD-SCNC: 136 MMOL/L (ref 132–146)
TOTAL PROTEIN: 5.4 G/DL (ref 6.4–8.3)
URINE CULTURE, ROUTINE: NORMAL
WBC # BLD: 6.8 E9/L (ref 4.5–11.5)

## 2022-01-31 LAB
TSH SERPL DL<=0.05 MIU/L-ACNC: 1.62 UIU/ML (ref 0.27–4.2)
VITAMIN B-12: >2000 PG/ML (ref 211–946)
VITAMIN D 25-HYDROXY: 30 NG/ML (ref 30–100)

## 2022-02-02 LAB
BACTERIA: ABNORMAL /HPF
BILIRUBIN URINE: NEGATIVE
BLOOD, URINE: NEGATIVE
CLARITY: CLEAR
COLOR: YELLOW
GLUCOSE URINE: 500 MG/DL
KETONES, URINE: NEGATIVE MG/DL
LEUKOCYTE ESTERASE, URINE: ABNORMAL
NITRITE, URINE: NEGATIVE
PH UA: 6.5 (ref 5–9)
PROTEIN UA: NEGATIVE MG/DL
RBC UA: ABNORMAL /HPF (ref 0–2)
SPECIFIC GRAVITY UA: 1.01 (ref 1–1.03)
UROBILINOGEN, URINE: 0.2 E.U./DL
WBC UA: ABNORMAL /HPF (ref 0–5)

## 2022-02-04 LAB
ORGANISM: ABNORMAL
URINE CULTURE, ROUTINE: ABNORMAL

## 2022-02-14 LAB
ALBUMIN SERPL-MCNC: 2.8 G/DL (ref 3.5–5.2)
ALP BLD-CCNC: 78 U/L (ref 35–104)
ALT SERPL-CCNC: 14 U/L (ref 0–32)
ANION GAP SERPL CALCULATED.3IONS-SCNC: 13 MMOL/L (ref 7–16)
AST SERPL-CCNC: 19 U/L (ref 0–31)
BACTERIA: ABNORMAL /HPF
BASOPHILS ABSOLUTE: 0.03 E9/L (ref 0–0.2)
BASOPHILS RELATIVE PERCENT: 0.4 % (ref 0–2)
BILIRUB SERPL-MCNC: 1 MG/DL (ref 0–1.2)
BILIRUBIN URINE: NEGATIVE
BLOOD, URINE: ABNORMAL
BUN BLDV-MCNC: 13 MG/DL (ref 6–23)
CALCIUM SERPL-MCNC: 8.3 MG/DL (ref 8.6–10.2)
CHLORIDE BLD-SCNC: 97 MMOL/L (ref 98–107)
CLARITY: ABNORMAL
CO2: 26 MMOL/L (ref 22–29)
COLOR: YELLOW
CREAT SERPL-MCNC: 1.1 MG/DL (ref 0.5–1)
EOSINOPHILS ABSOLUTE: 0.02 E9/L (ref 0.05–0.5)
EOSINOPHILS RELATIVE PERCENT: 0.2 % (ref 0–6)
GFR AFRICAN AMERICAN: 56
GFR NON-AFRICAN AMERICAN: 46 ML/MIN/1.73
GLUCOSE BLD-MCNC: 220 MG/DL (ref 74–99)
GLUCOSE URINE: 250 MG/DL
HCT VFR BLD CALC: 33.1 % (ref 34–48)
HEMOGLOBIN: 11.1 G/DL (ref 11.5–15.5)
IMMATURE GRANULOCYTES #: 0.07 E9/L
IMMATURE GRANULOCYTES %: 0.9 % (ref 0–5)
KETONES, URINE: ABNORMAL MG/DL
LEUKOCYTE ESTERASE, URINE: ABNORMAL
LYMPHOCYTES ABSOLUTE: 1.06 E9/L (ref 1.5–4)
LYMPHOCYTES RELATIVE PERCENT: 13.1 % (ref 20–42)
MAGNESIUM: 1.7 MG/DL (ref 1.6–2.6)
MCH RBC QN AUTO: 30.9 PG (ref 26–35)
MCHC RBC AUTO-ENTMCNC: 33.5 % (ref 32–34.5)
MCV RBC AUTO: 92.2 FL (ref 80–99.9)
MONOCYTES ABSOLUTE: 1.08 E9/L (ref 0.1–0.95)
MONOCYTES RELATIVE PERCENT: 13.3 % (ref 2–12)
NEUTROPHILS ABSOLUTE: 5.84 E9/L (ref 1.8–7.3)
NEUTROPHILS RELATIVE PERCENT: 72.1 % (ref 43–80)
NITRITE, URINE: NEGATIVE
PDW BLD-RTO: 13.5 FL (ref 11.5–15)
PH UA: 6 (ref 5–9)
PLATELET # BLD: 208 E9/L (ref 130–450)
PMV BLD AUTO: 10.3 FL (ref 7–12)
POTASSIUM SERPL-SCNC: 3.2 MMOL/L (ref 3.5–5)
PRO-BNP: 610 PG/ML (ref 0–450)
PROTEIN UA: 100 MG/DL
RBC # BLD: 3.59 E12/L (ref 3.5–5.5)
RBC UA: >20 /HPF (ref 0–2)
SODIUM BLD-SCNC: 136 MMOL/L (ref 132–146)
SPECIFIC GRAVITY UA: 1.01 (ref 1–1.03)
TOTAL PROTEIN: 5.5 G/DL (ref 6.4–8.3)
UROBILINOGEN, URINE: 0.2 E.U./DL
WBC # BLD: 8.1 E9/L (ref 4.5–11.5)
WBC UA: >20 /HPF (ref 0–5)

## 2022-02-15 ENCOUNTER — APPOINTMENT (OUTPATIENT)
Dept: GENERAL RADIOLOGY | Age: 87
End: 2022-02-15
Payer: MEDICARE

## 2022-02-15 ENCOUNTER — HOSPITAL ENCOUNTER (OUTPATIENT)
Age: 87
Setting detail: OBSERVATION
Discharge: NURSING, REHAB OR LONG-TERM CARE | End: 2022-02-18
Attending: EMERGENCY MEDICINE | Admitting: STUDENT IN AN ORGANIZED HEALTH CARE EDUCATION/TRAINING PROGRAM
Payer: MEDICARE

## 2022-02-15 ENCOUNTER — APPOINTMENT (OUTPATIENT)
Dept: CT IMAGING | Age: 87
End: 2022-02-15
Payer: MEDICARE

## 2022-02-15 DIAGNOSIS — N39.0 URINARY TRACT INFECTION WITHOUT HEMATURIA, SITE UNSPECIFIED: Primary | ICD-10-CM

## 2022-02-15 DIAGNOSIS — R77.8 ELEVATED TROPONIN: ICD-10-CM

## 2022-02-15 LAB
AMMONIA: <10 UMOL/L (ref 11–51)
ANION GAP SERPL CALCULATED.3IONS-SCNC: 11 MMOL/L (ref 7–16)
BACTERIA: ABNORMAL /HPF
BASOPHILS ABSOLUTE: 0.03 E9/L (ref 0–0.2)
BASOPHILS RELATIVE PERCENT: 0.3 % (ref 0–2)
BILIRUBIN URINE: NEGATIVE
BLOOD, URINE: ABNORMAL
BUN BLDV-MCNC: 15 MG/DL (ref 6–23)
CALCIUM SERPL-MCNC: 8.7 MG/DL (ref 8.6–10.2)
CHLORIDE BLD-SCNC: 94 MMOL/L (ref 98–107)
CLARITY: ABNORMAL
CO2: 25 MMOL/L (ref 22–29)
COLOR: YELLOW
CREAT SERPL-MCNC: 1 MG/DL (ref 0.5–1)
EOSINOPHILS ABSOLUTE: 0.01 E9/L (ref 0.05–0.5)
EOSINOPHILS RELATIVE PERCENT: 0.1 % (ref 0–6)
GFR AFRICAN AMERICAN: >60
GFR NON-AFRICAN AMERICAN: 52 ML/MIN/1.73
GLUCOSE BLD-MCNC: 229 MG/DL (ref 74–99)
GLUCOSE URINE: >=1000 MG/DL
HCT VFR BLD CALC: 33.8 % (ref 34–48)
HEMOGLOBIN: 11.3 G/DL (ref 11.5–15.5)
IMMATURE GRANULOCYTES #: 0.06 E9/L
IMMATURE GRANULOCYTES %: 0.6 % (ref 0–5)
KETONES, URINE: NEGATIVE MG/DL
LACTIC ACID: 1.5 MMOL/L (ref 0.5–2.2)
LEUKOCYTE ESTERASE, URINE: ABNORMAL
LYMPHOCYTES ABSOLUTE: 1.11 E9/L (ref 1.5–4)
LYMPHOCYTES RELATIVE PERCENT: 11.3 % (ref 20–42)
MCH RBC QN AUTO: 30.3 PG (ref 26–35)
MCHC RBC AUTO-ENTMCNC: 33.4 % (ref 32–34.5)
MCV RBC AUTO: 90.6 FL (ref 80–99.9)
MONOCYTES ABSOLUTE: 1.08 E9/L (ref 0.1–0.95)
MONOCYTES RELATIVE PERCENT: 11 % (ref 2–12)
NEUTROPHILS ABSOLUTE: 7.51 E9/L (ref 1.8–7.3)
NEUTROPHILS RELATIVE PERCENT: 76.7 % (ref 43–80)
NITRITE, URINE: NEGATIVE
PDW BLD-RTO: 13.2 FL (ref 11.5–15)
PH UA: 6 (ref 5–9)
PLATELET # BLD: 244 E9/L (ref 130–450)
PMV BLD AUTO: 9.5 FL (ref 7–12)
POTASSIUM REFLEX MAGNESIUM: 3.6 MMOL/L (ref 3.5–5)
PRO-BNP: 374 PG/ML (ref 0–450)
PROTEIN UA: 30 MG/DL
RBC # BLD: 3.73 E12/L (ref 3.5–5.5)
RBC UA: ABNORMAL /HPF (ref 0–2)
SODIUM BLD-SCNC: 130 MMOL/L (ref 132–146)
SPECIFIC GRAVITY UA: 1.01 (ref 1–1.03)
TROPONIN, HIGH SENSITIVITY: 87 NG/L (ref 0–9)
TROPONIN, HIGH SENSITIVITY: 94 NG/L (ref 0–9)
UROBILINOGEN, URINE: 0.2 E.U./DL
WBC # BLD: 9.8 E9/L (ref 4.5–11.5)
WBC UA: >20 /HPF (ref 0–5)
YEAST: PRESENT /HPF

## 2022-02-15 PROCEDURE — 83880 ASSAY OF NATRIURETIC PEPTIDE: CPT

## 2022-02-15 PROCEDURE — 83605 ASSAY OF LACTIC ACID: CPT

## 2022-02-15 PROCEDURE — 70450 CT HEAD/BRAIN W/O DYE: CPT

## 2022-02-15 PROCEDURE — 93005 ELECTROCARDIOGRAM TRACING: CPT | Performed by: STUDENT IN AN ORGANIZED HEALTH CARE EDUCATION/TRAINING PROGRAM

## 2022-02-15 PROCEDURE — 6370000000 HC RX 637 (ALT 250 FOR IP): Performed by: STUDENT IN AN ORGANIZED HEALTH CARE EDUCATION/TRAINING PROGRAM

## 2022-02-15 PROCEDURE — 87186 SC STD MICRODIL/AGAR DIL: CPT

## 2022-02-15 PROCEDURE — 82140 ASSAY OF AMMONIA: CPT

## 2022-02-15 PROCEDURE — 96374 THER/PROPH/DIAG INJ IV PUSH: CPT

## 2022-02-15 PROCEDURE — 99284 EMERGENCY DEPT VISIT MOD MDM: CPT

## 2022-02-15 PROCEDURE — 71045 X-RAY EXAM CHEST 1 VIEW: CPT

## 2022-02-15 PROCEDURE — 80048 BASIC METABOLIC PNL TOTAL CA: CPT

## 2022-02-15 PROCEDURE — 85025 COMPLETE CBC W/AUTO DIFF WBC: CPT

## 2022-02-15 PROCEDURE — 6360000002 HC RX W HCPCS

## 2022-02-15 PROCEDURE — P9612 CATHETERIZE FOR URINE SPEC: HCPCS

## 2022-02-15 PROCEDURE — 87088 URINE BACTERIA CULTURE: CPT

## 2022-02-15 PROCEDURE — 81001 URINALYSIS AUTO W/SCOPE: CPT

## 2022-02-15 PROCEDURE — 87077 CULTURE AEROBIC IDENTIFY: CPT

## 2022-02-15 PROCEDURE — 84484 ASSAY OF TROPONIN QUANT: CPT

## 2022-02-15 RX ORDER — CEFTRIAXONE 1 G/1
INJECTION, POWDER, FOR SOLUTION INTRAMUSCULAR; INTRAVENOUS
Status: COMPLETED
Start: 2022-02-15 | End: 2022-02-15

## 2022-02-15 RX ORDER — FLUCONAZOLE 100 MG/1
200 TABLET ORAL ONCE
Status: COMPLETED | OUTPATIENT
Start: 2022-02-15 | End: 2022-02-15

## 2022-02-15 RX ADMIN — CEFTRIAXONE 1000 MG: 1 INJECTION, POWDER, FOR SOLUTION INTRAMUSCULAR; INTRAVENOUS at 23:04

## 2022-02-15 RX ADMIN — FLUCONAZOLE 200 MG: 100 TABLET ORAL at 23:02

## 2022-02-16 PROBLEM — R77.8 ELEVATED TROPONIN: Status: ACTIVE | Noted: 2022-02-16

## 2022-02-16 PROBLEM — R79.89 ELEVATED TROPONIN: Status: ACTIVE | Noted: 2022-02-16

## 2022-02-16 PROBLEM — E11.9 DIABETES MELLITUS (HCC): Status: RESOLVED | Noted: 2019-11-13 | Resolved: 2022-02-16

## 2022-02-16 LAB
ALBUMIN SERPL-MCNC: 3.2 G/DL (ref 3.5–5.2)
ALP BLD-CCNC: 97 U/L (ref 35–104)
ALT SERPL-CCNC: 19 U/L (ref 0–32)
ANION GAP SERPL CALCULATED.3IONS-SCNC: 13 MMOL/L (ref 7–16)
AST SERPL-CCNC: 25 U/L (ref 0–31)
BILIRUB SERPL-MCNC: 0.6 MG/DL (ref 0–1.2)
BUN BLDV-MCNC: 13 MG/DL (ref 6–23)
CALCIUM SERPL-MCNC: 8.9 MG/DL (ref 8.6–10.2)
CHLORIDE BLD-SCNC: 97 MMOL/L (ref 98–107)
CO2: 25 MMOL/L (ref 22–29)
CREAT SERPL-MCNC: 0.9 MG/DL (ref 0.5–1)
EKG ATRIAL RATE: 93 BPM
EKG P AXIS: 66 DEGREES
EKG P-R INTERVAL: 142 MS
EKG Q-T INTERVAL: 396 MS
EKG QRS DURATION: 144 MS
EKG QTC CALCULATION (BAZETT): 492 MS
EKG R AXIS: -34 DEGREES
EKG T AXIS: 133 DEGREES
EKG VENTRICULAR RATE: 93 BPM
GFR AFRICAN AMERICAN: >60
GFR NON-AFRICAN AMERICAN: 58 ML/MIN/1.73
GLUCOSE BLD-MCNC: 207 MG/DL (ref 74–99)
HCT VFR BLD CALC: 34.6 % (ref 34–48)
HEMOGLOBIN: 11.4 G/DL (ref 11.5–15.5)
MAGNESIUM: 2.1 MG/DL (ref 1.6–2.6)
MCH RBC QN AUTO: 29.8 PG (ref 26–35)
MCHC RBC AUTO-ENTMCNC: 32.9 % (ref 32–34.5)
MCV RBC AUTO: 90.3 FL (ref 80–99.9)
METER GLUCOSE: 182 MG/DL (ref 74–99)
METER GLUCOSE: 202 MG/DL (ref 74–99)
METER GLUCOSE: 215 MG/DL (ref 74–99)
METER GLUCOSE: 232 MG/DL (ref 74–99)
ORGANISM: ABNORMAL
PDW BLD-RTO: 13.4 FL (ref 11.5–15)
PLATELET # BLD: 227 E9/L (ref 130–450)
PMV BLD AUTO: 9.3 FL (ref 7–12)
POTASSIUM REFLEX MAGNESIUM: 3.5 MMOL/L (ref 3.5–5)
RBC # BLD: 3.83 E12/L (ref 3.5–5.5)
SODIUM BLD-SCNC: 135 MMOL/L (ref 132–146)
TOTAL PROTEIN: 5.9 G/DL (ref 6.4–8.3)
TROPONIN, HIGH SENSITIVITY: 102 NG/L (ref 0–9)
TROPONIN, HIGH SENSITIVITY: 103 NG/L (ref 0–9)
TROPONIN, HIGH SENSITIVITY: 93 NG/L (ref 0–9)
TROPONIN, HIGH SENSITIVITY: 94 NG/L (ref 0–9)
URINE CULTURE, ROUTINE: ABNORMAL
WBC # BLD: 8.5 E9/L (ref 4.5–11.5)

## 2022-02-16 PROCEDURE — 6370000000 HC RX 637 (ALT 250 FOR IP): Performed by: INTERNAL MEDICINE

## 2022-02-16 PROCEDURE — 93010 ELECTROCARDIOGRAM REPORT: CPT | Performed by: INTERNAL MEDICINE

## 2022-02-16 PROCEDURE — 80053 COMPREHEN METABOLIC PANEL: CPT

## 2022-02-16 PROCEDURE — G0378 HOSPITAL OBSERVATION PER HR: HCPCS

## 2022-02-16 PROCEDURE — 96372 THER/PROPH/DIAG INJ SC/IM: CPT

## 2022-02-16 PROCEDURE — 36415 COLL VENOUS BLD VENIPUNCTURE: CPT

## 2022-02-16 PROCEDURE — 6360000002 HC RX W HCPCS: Performed by: NURSE PRACTITIONER

## 2022-02-16 PROCEDURE — 2580000003 HC RX 258: Performed by: STUDENT IN AN ORGANIZED HEALTH CARE EDUCATION/TRAINING PROGRAM

## 2022-02-16 PROCEDURE — 84484 ASSAY OF TROPONIN QUANT: CPT

## 2022-02-16 PROCEDURE — 85027 COMPLETE CBC AUTOMATED: CPT

## 2022-02-16 PROCEDURE — 6370000000 HC RX 637 (ALT 250 FOR IP): Performed by: NURSE PRACTITIONER

## 2022-02-16 PROCEDURE — 6370000000 HC RX 637 (ALT 250 FOR IP): Performed by: STUDENT IN AN ORGANIZED HEALTH CARE EDUCATION/TRAINING PROGRAM

## 2022-02-16 PROCEDURE — 83735 ASSAY OF MAGNESIUM: CPT

## 2022-02-16 PROCEDURE — 82962 GLUCOSE BLOOD TEST: CPT

## 2022-02-16 PROCEDURE — 6360000002 HC RX W HCPCS: Performed by: STUDENT IN AN ORGANIZED HEALTH CARE EDUCATION/TRAINING PROGRAM

## 2022-02-16 RX ORDER — HYDROCHLOROTHIAZIDE 25 MG/1
25 TABLET ORAL DAILY
Status: DISCONTINUED | OUTPATIENT
Start: 2022-02-17 | End: 2022-02-18 | Stop reason: HOSPADM

## 2022-02-16 RX ORDER — AMLODIPINE BESYLATE 5 MG/1
5 TABLET ORAL 2 TIMES DAILY
Status: DISCONTINUED | OUTPATIENT
Start: 2022-02-16 | End: 2022-02-16

## 2022-02-16 RX ORDER — VITAMIN B COMPLEX
1000 TABLET ORAL 2 TIMES DAILY
Status: DISCONTINUED | OUTPATIENT
Start: 2022-02-16 | End: 2022-02-18 | Stop reason: HOSPADM

## 2022-02-16 RX ORDER — METOPROLOL SUCCINATE 50 MG/1
50 TABLET, EXTENDED RELEASE ORAL DAILY
Status: DISCONTINUED | OUTPATIENT
Start: 2022-02-16 | End: 2022-02-18 | Stop reason: HOSPADM

## 2022-02-16 RX ORDER — AMOXICILLIN AND CLAVULANATE POTASSIUM 875; 125 MG/1; MG/1
1 TABLET, FILM COATED ORAL EVERY 12 HOURS SCHEDULED
Status: DISCONTINUED | OUTPATIENT
Start: 2022-02-16 | End: 2022-02-18 | Stop reason: HOSPADM

## 2022-02-16 RX ORDER — ROSUVASTATIN CALCIUM 20 MG/1
20 TABLET, COATED ORAL DAILY
Status: DISCONTINUED | OUTPATIENT
Start: 2022-02-16 | End: 2022-02-18 | Stop reason: HOSPADM

## 2022-02-16 RX ORDER — POTASSIUM CHLORIDE 750 MG/1
20 TABLET, EXTENDED RELEASE ORAL DAILY
Status: DISCONTINUED | OUTPATIENT
Start: 2022-02-16 | End: 2022-02-18 | Stop reason: HOSPADM

## 2022-02-16 RX ORDER — ALPRAZOLAM 0.25 MG/1
0.25 TABLET ORAL 2 TIMES DAILY
COMMUNITY

## 2022-02-16 RX ORDER — TRAMADOL HYDROCHLORIDE 50 MG/1
50 TABLET ORAL EVERY 6 HOURS PRN
Status: DISCONTINUED | OUTPATIENT
Start: 2022-02-16 | End: 2022-02-18 | Stop reason: HOSPADM

## 2022-02-16 RX ORDER — DEXTROSE MONOHYDRATE 25 G/50ML
12.5 INJECTION, SOLUTION INTRAVENOUS PRN
Status: DISCONTINUED | OUTPATIENT
Start: 2022-02-16 | End: 2022-02-16 | Stop reason: RX

## 2022-02-16 RX ORDER — AMLODIPINE BESYLATE 10 MG/1
10 TABLET ORAL DAILY
Status: DISCONTINUED | OUTPATIENT
Start: 2022-02-16 | End: 2022-02-18 | Stop reason: HOSPADM

## 2022-02-16 RX ORDER — ONDANSETRON 4 MG/1
4 TABLET, ORALLY DISINTEGRATING ORAL EVERY 8 HOURS PRN
Status: DISCONTINUED | OUTPATIENT
Start: 2022-02-16 | End: 2022-02-18 | Stop reason: HOSPADM

## 2022-02-16 RX ORDER — DEXTROSE MONOHYDRATE 50 MG/ML
100 INJECTION, SOLUTION INTRAVENOUS PRN
Status: DISCONTINUED | OUTPATIENT
Start: 2022-02-16 | End: 2022-02-18 | Stop reason: HOSPADM

## 2022-02-16 RX ORDER — ATORVASTATIN CALCIUM 40 MG/1
40 TABLET, FILM COATED ORAL NIGHTLY
Status: DISCONTINUED | OUTPATIENT
Start: 2022-02-16 | End: 2022-02-16 | Stop reason: SDUPTHER

## 2022-02-16 RX ORDER — ACETAMINOPHEN 325 MG/1
650 TABLET ORAL EVERY 6 HOURS PRN
Status: DISCONTINUED | OUTPATIENT
Start: 2022-02-16 | End: 2022-02-18 | Stop reason: HOSPADM

## 2022-02-16 RX ORDER — ASPIRIN 81 MG/1
81 TABLET, CHEWABLE ORAL DAILY
Status: DISCONTINUED | OUTPATIENT
Start: 2022-02-17 | End: 2022-02-18 | Stop reason: HOSPADM

## 2022-02-16 RX ORDER — ONDANSETRON 2 MG/ML
4 INJECTION INTRAMUSCULAR; INTRAVENOUS EVERY 6 HOURS PRN
Status: DISCONTINUED | OUTPATIENT
Start: 2022-02-16 | End: 2022-02-18 | Stop reason: HOSPADM

## 2022-02-16 RX ORDER — LISINOPRIL 20 MG/1
20 TABLET ORAL DAILY
Status: DISCONTINUED | OUTPATIENT
Start: 2022-02-16 | End: 2022-02-18 | Stop reason: HOSPADM

## 2022-02-16 RX ORDER — HYDROCHLOROTHIAZIDE 12.5 MG/1
12.5 TABLET ORAL DAILY
Status: DISCONTINUED | OUTPATIENT
Start: 2022-02-16 | End: 2022-02-16

## 2022-02-16 RX ORDER — NICOTINE POLACRILEX 4 MG
15 LOZENGE BUCCAL PRN
Status: DISCONTINUED | OUTPATIENT
Start: 2022-02-16 | End: 2022-02-18 | Stop reason: HOSPADM

## 2022-02-16 RX ORDER — ATORVASTATIN CALCIUM 40 MG/1
40 TABLET, FILM COATED ORAL NIGHTLY
Status: DISCONTINUED | OUTPATIENT
Start: 2022-02-16 | End: 2022-02-16

## 2022-02-16 RX ORDER — LANOLIN ALCOHOL/MO/W.PET/CERES
1000 CREAM (GRAM) TOPICAL DAILY
COMMUNITY

## 2022-02-16 RX ORDER — ACETAMINOPHEN 650 MG/1
650 SUPPOSITORY RECTAL EVERY 6 HOURS PRN
Status: DISCONTINUED | OUTPATIENT
Start: 2022-02-16 | End: 2022-02-18 | Stop reason: HOSPADM

## 2022-02-16 RX ORDER — PREDNISONE 1 MG/1
5 TABLET ORAL DAILY
Status: DISCONTINUED | OUTPATIENT
Start: 2022-02-16 | End: 2022-02-18 | Stop reason: HOSPADM

## 2022-02-16 RX ORDER — CEFTRIAXONE 1 G/1
INJECTION, POWDER, FOR SOLUTION INTRAMUSCULAR; INTRAVENOUS
Status: DISCONTINUED
Start: 2022-02-16 | End: 2022-02-16

## 2022-02-16 RX ORDER — ASPIRIN 81 MG/1
81 TABLET, CHEWABLE ORAL
COMMUNITY

## 2022-02-16 RX ADMIN — ENOXAPARIN SODIUM 40 MG: 100 INJECTION SUBCUTANEOUS at 12:51

## 2022-02-16 RX ADMIN — INSULIN LISPRO 4 UNITS: 100 INJECTION, SOLUTION INTRAVENOUS; SUBCUTANEOUS at 12:00

## 2022-02-16 RX ADMIN — INSULIN LISPRO 2 UNITS: 100 INJECTION, SOLUTION INTRAVENOUS; SUBCUTANEOUS at 12:28

## 2022-02-16 RX ADMIN — LISINOPRIL 20 MG: 20 TABLET ORAL at 12:56

## 2022-02-16 RX ADMIN — INSULIN LISPRO 2 UNITS: 100 INJECTION, SOLUTION INTRAVENOUS; SUBCUTANEOUS at 21:07

## 2022-02-16 RX ADMIN — HYDROCHLOROTHIAZIDE 12.5 MG: 12.5 TABLET ORAL at 12:55

## 2022-02-16 RX ADMIN — INSULIN LISPRO 4 UNITS: 100 INJECTION, SOLUTION INTRAVENOUS; SUBCUTANEOUS at 16:50

## 2022-02-16 RX ADMIN — Medication 1000 UNITS: at 12:55

## 2022-02-16 RX ADMIN — METOPROLOL SUCCINATE 50 MG: 50 TABLET, EXTENDED RELEASE ORAL at 12:53

## 2022-02-16 RX ADMIN — AMLODIPINE BESYLATE 10 MG: 10 TABLET ORAL at 16:57

## 2022-02-16 RX ADMIN — SODIUM CHLORIDE, PRESERVATIVE FREE 1000 MG: 5 INJECTION INTRAVENOUS at 07:46

## 2022-02-16 RX ADMIN — AMOXICILLIN AND CLAVULANATE POTASSIUM 1 TABLET: 875; 125 TABLET, FILM COATED ORAL at 14:24

## 2022-02-16 RX ADMIN — AMLODIPINE BESYLATE 5 MG: 5 TABLET ORAL at 12:52

## 2022-02-16 RX ADMIN — ROSUVASTATIN CALCIUM 20 MG: 20 TABLET, FILM COATED ORAL at 21:09

## 2022-02-16 RX ADMIN — TRAMADOL HYDROCHLORIDE 50 MG: 50 TABLET, COATED ORAL at 12:54

## 2022-02-16 RX ADMIN — POTASSIUM CHLORIDE 20 MEQ: 750 TABLET, EXTENDED RELEASE ORAL at 12:52

## 2022-02-16 RX ADMIN — Medication 1000 UNITS: at 21:09

## 2022-02-16 RX ADMIN — PREDNISONE 5 MG: 5 TABLET ORAL at 12:54

## 2022-02-16 ASSESSMENT — PAIN DESCRIPTION - DESCRIPTORS: DESCRIPTORS: ACHING

## 2022-02-16 ASSESSMENT — PAIN SCALES - GENERAL
PAINLEVEL_OUTOF10: 10
PAINLEVEL_OUTOF10: 10
PAINLEVEL_OUTOF10: 0
PAINLEVEL_OUTOF10: 0

## 2022-02-16 ASSESSMENT — PAIN DESCRIPTION - PAIN TYPE: TYPE: ACUTE PAIN

## 2022-02-16 ASSESSMENT — PAIN DESCRIPTION - ONSET: ONSET: GRADUAL

## 2022-02-16 ASSESSMENT — PAIN DESCRIPTION - PROGRESSION: CLINICAL_PROGRESSION: NOT CHANGED

## 2022-02-16 ASSESSMENT — PAIN DESCRIPTION - LOCATION: LOCATION: OTHER (COMMENT)

## 2022-02-16 ASSESSMENT — PAIN DESCRIPTION - FREQUENCY: FREQUENCY: INTERMITTENT

## 2022-02-16 ASSESSMENT — PAIN - FUNCTIONAL ASSESSMENT: PAIN_FUNCTIONAL_ASSESSMENT: PREVENTS OR INTERFERES SOME ACTIVE ACTIVITIES AND ADLS

## 2022-02-16 ASSESSMENT — PAIN DESCRIPTION - ORIENTATION: ORIENTATION: OTHER (COMMENT)

## 2022-02-16 NOTE — FLOWSHEET NOTE
Inpatient Wound Care    Admit Date: 2/15/2022  8:00 PM    Reason for consult:  Stage 2 pressure coccyx    Significant history:PMH of CKD, arthritis and osteoporosis, polymyalgia rheumatica, type 2 diabetes, dementia, who presented with a chief complaint of altered mental status. DM2. RA, CKD    Wound history: buttocks wound POA  Findings:  present.  Patient awake, verbal  Proximal to wound is 1x3 dark Purple DTI     02/16/22 1514   Wound 02/16/22 [place]   Date First Assessed/Time First Assessed: 02/16/22 0230   Present on Hospital Admission: Yes  Primary Wound Type: Pressure Injury  Location: [place]   Wound Image    Wound Etiology Pressure Stage  3   Wound Length (cm) 3 cm   Wound Width (cm) 2 cm   Wound Depth (cm) 0.3 cm   Wound Surface Area (cm^2) 6 cm^2   Change in Wound Size % (l*w) 0   Wound Volume (cm^3) 1.8 cm^3   Wound Healing % -200   Wound Assessment   (red)   Drainage Amount Small   Drainage Description Serosanguinous   Odor None   Payton-wound Assessment   (batsheva, white edge)     Rt heel with DTI        Impression:  Stage 3 buttocks rt buttocks, ischial area  DTI rt heel, stage 1 left heel  Interventions in place:  Heel protectors    Plan: needs comfort glide, heels floated, skin prep to heels  Dietician  dressing      NETO Dexter 2/16/2022 3:19 PM

## 2022-02-16 NOTE — ED PROVIDER NOTES
24-year-old female with a history of dementia sent from nursing facility for concern for altered mental status. Per nursing report, patient is typically alert and oriented to person, place, questionably to time, but not situation. Throughout today, she has seemed more confused. They noted that she seemed to be leaning to the left while in her wheelchair. Patient reportedly has frequent UTIs and on lab work sent from facility, UTI noted on urinalysis. No antibiotics have been given so far. On exam, patient is alert and oriented to person, month, and age. She is currently denying all complaints. Review of Systems   Unable to perform ROS: Dementia        Physical Exam  Constitutional:       General: She is not in acute distress. Appearance: She is not ill-appearing. HENT:      Head: Normocephalic and atraumatic. Eyes:      General: No scleral icterus. Pupils: Pupils are equal, round, and reactive to light. Cardiovascular:      Rate and Rhythm: Normal rate and regular rhythm. Pulmonary:      Effort: Pulmonary effort is normal.      Breath sounds: Normal breath sounds. Abdominal:      Palpations: Abdomen is soft. Musculoskeletal:      Cervical back: Neck supple. Skin:     General: Skin is warm and dry. Neurological:      Mental Status: She is alert. GCS: GCS eye subscore is 4. GCS verbal subscore is 4. GCS motor subscore is 6. Psychiatric:         Mood and Affect: Mood normal.         Behavior: Behavior normal.          Procedures     MDM  Number of Diagnoses or Management Options  Elevated troponin  Urinary tract infection without hematuria, site unspecified  Diagnosis management comments: 24-year-old female with dementia sent from nursing facility for altered mental status. Patient at apparent mental baseline on exam.  UA on UTI, for which patient was given dose of Rocephin. CT head and chest x-ray unremarkable.   Labs remarkable for initial troponin 87 which trended up to 94 and then to 102. No acute changes on EKG and patient is not complaining of chest pain at this time. Due to uptrending troponin, decision was made to the patient for further evaluation and treatment. Patient was admitted to stable condition.                  --------------------------------------------- PAST HISTORY ---------------------------------------------  Past Medical History:  has a past medical history of Anxiety, Chronic kidney disease, Decreased bone density, Foot drop, right foot, Hyperlipidemia, Hypertension, Intervertebral disc disorder, Lumbar stenosis, Nontoxic multinodular goiter, Osteoarthritis, Osteopenia, Polymyalgia rheumatica ([facility] [address] TIA (transient ischemic attack), Type 2 diabetes mellitus ([facility] [address] and Vitamin D deficiency. Past Surgical History:  has a past surgical history that includes Hysterectomy; Appendectomy; Mohs surgery (Left, 10/2012); Wrist fracture surgery (Bilateral, 02/2015); and Anesthesia Nerve Block (Right, 6/18/2020). Social History:  reports that she has never smoked. She has never used smokeless tobacco. She reports current alcohol use. She reports that she does not use drugs. Family History: family history includes High Blood Pressure in her mother; Kidney Disease in her mother; Other in her son; Stroke in her mother. The patients home medications have been reviewed. Allergies:  Iv dye [iodides], Pcn [penicillins], Relafen [nabumetone], and Niaspan [niacin er]    -------------------------------------------------- RESULTS -------------------------------------------------    LABS:  Results for orders placed or performed during the hospital encounter of 02/15/22   CBC Auto Differential   Result Value Ref Range    WBC 9.8 4.5 - 11.5 E9/L    RBC 3.73 3.50 - 5.50 E12/L    Hemoglobin 11.3 (L) 11.5 - 15.5 g/dL    Hematocrit 33.8 (L) 34.0 - 48.0 %    MCV 90.6 80.0 - 99.9 fL    MCH 30.3 26.0 - 35.0 pg    MCHC 33.4 32.0 - 34.5 %    RDW 13.2 11.5 - 15.0 fL Platelets 604 743 - 155 E9/L    MPV 9.5 7.0 - 12.0 fL    Neutrophils % 76.7 43.0 - 80.0 %    Immature Granulocytes % 0.6 0.0 - 5.0 %    Lymphocytes % 11.3 (L) 20.0 - 42.0 %    Monocytes % 11.0 2.0 - 12.0 %    Eosinophils % 0.1 0.0 - 6.0 %    Basophils % 0.3 0.0 - 2.0 %    Neutrophils Absolute 7.51 (H) 1.80 - 7.30 E9/L    Immature Granulocytes # 0.06 E9/L    Lymphocytes Absolute 1.11 (L) 1.50 - 4.00 E9/L    Monocytes Absolute 1.08 (H) 0.10 - 0.95 E9/L    Eosinophils Absolute 0.01 (L) 0.05 - 0.50 E9/L    Basophils Absolute 0.03 0.00 - 0.20 B4/X   Basic Metabolic Panel w/ Reflex to MG   Result Value Ref Range    Sodium 130 (L) 132 - 146 mmol/L    Potassium reflex Magnesium 3.6 3.5 - 5.0 mmol/L    Chloride 94 (L) 98 - 107 mmol/L    CO2 25 22 - 29 mmol/L    Anion Gap 11 7 - 16 mmol/L    Glucose 229 (H) 74 - 99 mg/dL    BUN 15 6 - 23 mg/dL    CREATININE 1.0 0.5 - 1.0 mg/dL    GFR Non-African American 52 >=60 mL/min/1.73    GFR African American >60     Calcium 8.7 8.6 - 10.2 mg/dL   Troponin   Result Value Ref Range    Troponin, High Sensitivity 87 (H) 0 - 9 ng/L   Brain Natriuretic Peptide   Result Value Ref Range    Pro- 0 - 450 pg/mL   Urinalysis, reflex to microscopic   Result Value Ref Range    Color, UA Yellow Straw/Yellow    Clarity, UA SL CLOUDY Clear    Glucose, Ur >=1000 (A) Negative mg/dL    Bilirubin Urine Negative Negative    Ketones, Urine Negative Negative mg/dL    Specific Gravity, UA 1.010 1.005 - 1.030    Blood, Urine SMALL (A) Negative    pH, UA 6.0 5.0 - 9.0    Protein, UA 30 (A) Negative mg/dL    Urobilinogen, Urine 0.2 <2.0 E.U./dL    Nitrite, Urine Negative Negative    Leukocyte Esterase, Urine LARGE (A) Negative   Lactic Acid, Plasma   Result Value Ref Range    Lactic Acid 1.5 0.5 - 2.2 mmol/L   Ammonia   Result Value Ref Range    Ammonia <10.0 (L) 11.0 - 51.0 umol/L   Microscopic Urinalysis   Result Value Ref Range    WBC, UA >20 (A) 0 - 5 /HPF    RBC, UA 5-10 (A) 0 - 2 /HPF    Bacteria, UA RARE (A) None Seen /HPF    Yeast, UA Present (A) None Seen /HPF   Troponin   Result Value Ref Range    Troponin, High Sensitivity 94 (H) 0 - 9 ng/L   Troponin   Result Value Ref Range    Troponin, High Sensitivity 102 (H) 0 - 9 ng/L   EKG 12 Lead   Result Value Ref Range    Ventricular Rate 93 BPM    Atrial Rate 93 BPM    P-R Interval 142 ms    QRS Duration 144 ms    Q-T Interval 396 ms    QTc Calculation (Bazett) 492 ms    P Axis 66 degrees    R Axis -34 degrees    T Axis 133 degrees       RADIOLOGY:  XR CHEST PORTABLE   Final Result   Hazy bibasilar opacities, likely atelectasis. However, superimposed   pneumonia/aspiration is not entirely excluded. CT Head WO Contrast   Final Result   No acute intracranial abnormality. RECOMMENDATIONS:   Unavailable             EKG:  This EKG is signed and interpreted by me. Rate: 93  Rhythm: Sinus  Interpretation: LBBB, no acute ST-T changes  Comparison: no previous EKG available      ------------------------- NURSING NOTES AND VITALS REVIEWED ---------------------------  Date / Time Roomed:  2/15/2022  8:00 PM  ED Bed Assignment:  23/23    The nursing notes within the ED encounter and vital signs as below have been reviewed. Patient Vitals for the past 24 hrs:   BP Temp Temp src Pulse Resp SpO2 Height Weight   02/15/22 2347 (!) 178/81   88 16 96 %     02/15/22 2008 131/66 98.7 °F (37.1 °C) Oral 94 18 95 % 5' 2\" (1.575 m) 154 lb (69.9 kg)       Oxygen Saturation Interpretation: Normal      --------------------------------- ADDITIONAL PROVIDER NOTES ---------------------------------    This patient's ED course included: a personal history and physicial examination, re-evaluation prior to disposition, multiple bedside re-evaluations, IV medications, cardiac monitoring and continuous pulse oximetry    This patient has remained hemodynamically stable during their ED course. Diagnosis:  1. Urinary tract infection without hematuria, site unspecified    2. Elevated troponin        Disposition:  Patient's disposition: Admit to telemetry  Patient's condition is stable.             Nicolette Roland MD  Resident  02/16/22 7699

## 2022-02-16 NOTE — H&P
Internal Medicine History & Physical     Name: Traci Pinto  : 1928  Chief Complaint: Altered Mental Status (EMS states [facility] staff reported \"increasing confusion all day\" and \"leaning this afternoon\", hx dementia)  Primary Care Physician: Alma Case MD  Admission date: 2/15/2022  Date of service: 2022     History of Present Illness  Sally Wright is a 80y.o. year old female with a PMH of CKD, arthritis and osteoporosis, polymyalgia rheumatica, type 2 diabetes, dementia, who presented with a chief complaint of altered mental status. Per nursing report, patient is normally oriented to person place and sometimes time but never situation. She seemed more confused yesterday and was seem to be leaning to the left frequently. She also reportedly has frequent UTIs. In the ED the patient was found to have evidence for a UTI. Patient also had a an elevated troponin, likely related to her UTI. Based on results from urinalysis, patient started on ceftriaxone and fluconazole. The patient was admitted for altered mental status due to urinary tract infection and elevated troponin. Upon evaluation this morning, the patient has no complaints other than she is not sure why she is in the hospital.  She states that she was with her family and the next thing she knew she was in an ambulance. Based on other documentation, patient is likely to be an unreliable historian at this time there is no family at bedside.         Past Medical History:   Diagnosis Date    Anxiety     Chronic kidney disease     Decreased bone density     Foot drop, right foot     Hyperlipidemia     Hypertension     Intervertebral disc disorder     Lumbar stenosis     Nontoxic multinodular goiter     Osteoarthritis     Osteopenia     Polymyalgia rheumatica (HCC)     TIA (transient ischemic attack)     Type 2 diabetes mellitus ([facility] [address]     Vitamin D deficiency        Past Surgical History:   Procedure Laterality NETO Peters - CHAVA   hydrochlorothiazide (HYDRODIURIL) 12.5 MG tablet Take 1 tablet by mouth daily 5/5/20   NETO Peters - CNP   lisinopril (PRINIVIL;ZESTRIL) 20 MG tablet Take 1 tablet by mouth daily 5/5/20   NETO Rush - CNP   metoprolol succinate (TOPROL XL) 50 MG extended release tablet Take 1 tablet by mouth daily 5/5/20   NETO Rush - CNP   potassium chloride (MICRO-K) 10 MEQ extended release capsule 3 po daily 5/5/20   NETO Peters - CNP   rosuvastatin (CRESTOR) 20 MG tablet Take 1 tablet by mouth daily 5/5/20   NETO Peters - CNP   predniSONE (DELTASONE) 5 MG tablet 2.5 mg  11/7/19   Historical Provider, MD   BD PEN NEEDLE RISA U/F 32G X 4 MM MISC  11/6/19   Historical Provider, MD   Glucosamine-Chondroitin 4733-3878 MG/30ML LIQD Take by mouth    Historical Provider, MD   insulin lispro (HUMALOG KWIKPEN) 200 UNIT/ML SOPN pen 14units q am 5/15/19   Radha Escobar MD   insulin glargine (BASAGLAR KWIKPEN) 100 UNIT/ML injection pen Inject 20 Units into the skin nightly 5/15/19   Radha Escobar MD   diclofenac sodium 1 % GEL Apply pea size to right shoulder three times per day as needd    Historical Provider, MD   Probiotic Product (VSL#3 PO) Take 1 tablet by mouth 2 times daily    Historical Provider, MD   vitamin D 1000 UNITS CAPS Take 1 capsule by mouth 2 times daily    Historical Provider, MD   Coenzyme Q10 (CO Q 10) 10 MG CAPS Take by mouth    Historical Provider, MD   Calcium Carbonate-Vit D-Min (CALCIUM 1200 PO) Take by mouth    Historical Provider, MD       Allergies  Allergies   Allergen Reactions    Iv Dye [Iodides] Hives    Pcn [Penicillins] Hives    Relafen [Nabumetone] Hives    Niaspan [Niacin Er]      Night sweats       Review of Systems  Please see HPI above. All bolded are positive. All un-bolded are negative.   Constitutional Symptoms: fever, chills, fatigue, generalized weakness, diaphoresis, increase in thirst, loss of appetite  Eyes: vision change   Ears, Nose, Mouth, Throat: hearing loss, nasal congestion, sores in the mouth  Cardiovascular: chest pain, chest heaviness, palpitations  Respiratory: shortness of breath, wheezing, coughing  Gastrointestinal: abdominal pain, nausea, vomiting, diarrhea, constipation, melena, hematochezia, hematemesis  Genitourinary: dysuria, hematuria, increased frequency  Musculoskeletal: lower extremity edema, myalgias, arthralgias, back pain  Integumentary: rashes, itching   Neurological: headache, lightheadedness, dizziness, confusion, syncope, numbness, tingling, focal weakness  Psychiatric: depression, suicidal ideation, anxiety  Endocrine: unintentional weight change  Hematologic/Lymphatic: lymphadenopathy, easy bruising, easy bleeding   Allergic/Immunologic: recurrent infections      Objective  VITALS:  BP (!) 130/59   Pulse 99   Temp 98.2 °F (36.8 °C) (Axillary)   Resp 18   Ht 5' 2\" (1.575 m)   Wt 150 lb (68 kg)   SpO2 98%   BMI 27.44 kg/m²     Physical Exam:  General: awake, alert, oriented to person, place, time, but not purpose, appears stated age, cooperative, no acute distress, pleasant, appropriate mood  Eyes: conjunctivae/corneas clear, sclera non icteric, EOMI  Ears: no obvious scars, no lesions, no masses, hearing intact  Mouth: mucous membranes moist, no obvious oral sores  Head: normocephalic, atraumatic  Neck: no JVD, no adenopathy, no thyromegaly, neck is supple, trachea is midline  Back: ROM normal, no CVA tenderness.   Chest: no pain on palpation  Lungs: clear to auscultation bilaterally, without rhonchi, crackle, wheezing, or rale, no retractions or use of accessory muscles  Heart: regular rate and regular rhythm, no murmur, normal S1, S2  Abdomen: soft, non-tender; bowel sounds normal; no masses, no organomegaly  : Deferred   Extremities: no lower extremity edema, extremities atraumatic, no cyanosis, no clubbing, 2+ pedal pulses palpated  Skin: normal color, normal texture, normal turgor, no rashes, no lesions  Neurologic:5/5 muscle strength throughout, normal muscle tone throughout, face symmetric, hearing intact, tongue midline, speech appropriate without slurring, sensation to fine touch intact in upper and lower extremities    Labs-   Lab Results   Component Value Date    WBC 8.5 02/16/2022    HGB 11.4 (L) 02/16/2022    HCT 34.6 02/16/2022     02/16/2022     (L) 02/15/2022    K 3.6 02/15/2022    CL 94 (L) 02/15/2022    CREATININE 1.0 02/15/2022    BUN 15 02/15/2022    CO2 25 02/15/2022    GLUCOSE 229 (H) 02/15/2022    ALT 14 02/14/2022    AST 19 02/14/2022    INR 1.03 01/03/2022     Lab Results   Component Value Date    TROPONINI 0.07 (H) 01/04/2022       Last echocardiogram: None on file, plan to do an echocardiogram today. Recent Radiological Studies:  XR CHEST PORTABLE   Final Result   Hazy bibasilar opacities, likely atelectasis. However, superimposed   pneumonia/aspiration is not entirely excluded. CT Head WO Contrast   Final Result   No acute intracranial abnormality.       RECOMMENDATIONS:   Unavailable             Assessment  Active Hospital Problems    Diagnosis     Elevated troponin [R77.8]     Chronic pain syndrome [G89.4]     Onychomycosis [B35.1]     Foot drop, right foot [M21.371]     PVD (peripheral vascular disease) ([facility]) [I73.9]     LBBB (left bundle branch block) [I44.7]     Vitamin B12 deficiency [E53.8]     Polymyalgia rheumatica ([facility]) [M35.3]     Vitamin D deficiency [E55.9]     Nontoxic multinodular goiter [E04.2]     Hypertension [I10]     Hyperlipidemia [E78.5]     Osteopenia [M85.80]     CKD (chronic kidney disease) stage 3, GFR 30-59 ml/min ([facility]) [N18.30]     Type II diabetes mellitus with peripheral circulatory disorder ([facility]) [E11.51]     Cervical disc disease [M50.90]     Chronic osteoarthritis [M19.90]     Anxiety [F41.9]        Patient Active Problem List    Diagnosis Date Noted    Elevated troponin 02/16/2022    Corns and callus 08/11/2020    Sacroiliac dysfunction 06/08/2020    Chronic right shoulder pain 11/21/2019    Chronic right-sided low back pain with right-sided sciatica 11/21/2019    Encounter for immunization 11/13/2019    Chronic pain syndrome 11/13/2019    Actinic keratosis 11/13/2019    Onychomycosis 07/30/2019    Foot drop, right foot 07/30/2019    Pain of toe of right foot 07/30/2019    Pain of toe of left foot 07/30/2019    PVD (peripheral vascular disease) ([facility] [address] 07/30/2019    Difficulty walking 07/30/2019    Need for prophylactic vaccination and inoculation against varicella 05/15/2019    LBBB (left bundle branch block) 05/15/2019    Vitamin B12 deficiency 05/15/2019    Vitamin D deficiency     Nontoxic multinodular goiter     Hypertension     Hyperlipidemia     Osteopenia     CKD (chronic kidney disease) stage 3, GFR 30-59 ml/min ([facility])     Type II diabetes mellitus with peripheral circulatory disorder (HCC)     Polymyalgia rheumatica ([facility])     Cervical disc disease     Chronic osteoarthritis     Anxiety     Pain of right upper arm 05/10/2017       Plan  · Urinary tract infection  · Enterococcus faecalis   · Based on sensitivities, will will start patient on Augmentin  · Monitor for Hives while on Penicillin   · Discussed with son   · Elevated troponins   · Continue to trend   · Cardio on board   · Conservative tx   · Continue Lovenox, beta-blocker and aspirin. · LBBB  · Onset? · ECHO   · Cardio on board  · Diabetes Mellitus   · POCT glucose   · Hypoglycemia tx orders   · ISS   · PT/OT  · Home medications to be reconciled and confirmed prior to being ordered  · DVT prophylaxis  lovenoix  · Code Status DNR CCA   · Discharge plan: Tomorrow if cleared by cardio     Darryle Line, MD  2/16/2022  8:53 AM    I can be reached through [place]. NOTE:  This report was transcribed using voice recognition software.   Every effort was made to ensure accuracy; however, inadvertent computerized transcription errors may be present. Addendum: I have personally participated in a face-to-face history and physical exam on the date of service with the patient. I have discussed the case with the resident. I also participated in medical decision making with the resident on the date of service and I agree with all of the pertinent clinical information unless indicated in my editing of the note. I have reviewed and edited the note above based on my findings during my history, exam, and decision making on the same day of service. The vitals, labs, imaging, medications and treatment plan were reviewed independently by myself in addition to with the resident doctor. I agree with the above documentation and treatment plan     Electronically signed by Betty Garcia MD on 2/16/2022 at 4:52 PM    I can be reached through [address] [place] [address].

## 2022-02-16 NOTE — DISCHARGE INSTR - COC
Continuity of Care Form    Patient Name: Traci Pinto   :  1928  MRN:  [id number]    Admit date:  2/15/2022  Discharge date:  ***    Code Status Order: DNR-CCA   Advance Directives:      Admitting Physician:  Cassandra Feliciano MD  PCP: Alma Case MD    Discharging Nurse: [facility] Unit/Room#: 1559/8600-F  Discharging Unit Phone Number: ***    Emergency Contact:   Extended Emergency Contact Information  Primary Emergency Contact: Inocencia Price of [address] [place] [address] Phone: [phone]  Mobile Phone: [phone]  Relation: Child   needed?  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 Tracey Rangel will be having [facility]'s     WRIST FRACTURE SURGERY Bilateral 2015    while in [place]        Immunization History:   Immunization History   Administered Date(s) Administered    COVID-19, Pfizer Purple top, DILUTE for use, 12+ yrs, 30mcg/0.3mL dose 2021, 2021, 10/05/2021    Influenza Vaccine, unspecified formulation 10/28/2010, 10/28/2011, 2012, 2013, 10/21/2015, 10/12/2016, 2017    Influenza, High Dose (Fluzone 65 yrs and older) 10/31/2018    Influenza, Triv, inactivated, subunit, adjuvanted, IM (Fluad 65 yrs and older) 2019    Pneumococcal Polysaccharide ([id number]) 10/21/2015    Tdap (Boostrix, Adacel) 2013    Zoster Live (Zostavax) 2016    Zoster Recombinant (Shingrix) 2019, 10/01/2019       Active Problems:  Patient Active Problem List   Diagnosis Code Pain of right upper arm M79.621    Vitamin D deficiency E55.9    Nontoxic multinodular goiter E04.2    Hypertension I10    Hyperlipidemia E78.5    Osteopenia M85.80    CKD (chronic kidney disease) stage 3, GFR 30-59 ml/min ([facility]) N18.30    Type II diabetes mellitus with peripheral circulatory disorder ([facility]) E11.51    Polymyalgia rheumatica ([facility]) M35.3    Cervical disc disease M50.90    Chronic osteoarthritis M19.90    Anxiety F41.9    Need for prophylactic vaccination and inoculation against varicella Z23    LBBB (left bundle branch block) I44.7    Vitamin B12 deficiency E53.8    Onychomycosis B35.1    Foot drop, right foot M21.371    Pain of toe of right foot M79.674    Pain of toe of left foot M79.675    PVD (peripheral vascular disease) ([facility]) I73.9    Difficulty walking R26.2    Encounter for immunization Z23    Chronic pain syndrome G89.4    Actinic keratosis L57.0    Chronic right shoulder pain M25.511, G89.29    Chronic right-sided low back pain with right-sided sciatica M54.41, G89.29    Sacroiliac dysfunction M53.3    Corns and callus L84    Elevated troponin R77.8       Isolation/Infection:   Isolation            No Isolation          Patient Infection Status       Infection Onset Added Last Indicated Last Indicated By Review Planned Expiration Resolved Resolved By    None active    Resolved    COVID-19 (Rule Out) 06/12/20 06/12/20 06/12/20 Covid-19 Ambulatory (Ordered)   06/14/20 Rule-Out Test Resulted    Not detected 6/12/2020            Nurse Assessment:  Last Vital Signs: BP (!) 130/59   Pulse 99   Temp 98.2 °F (36.8 °C) (Axillary)   Resp 18   Ht 5' 2\" (1.575 m)   Wt 150 lb (68 kg)   SpO2 98%   BMI 27.44 kg/m²     Last documented pain score (0-10 scale): Pain Level: 0  Last Weight:   Wt Readings from Last 1 Encounters:   02/16/22 150 lb (68 kg)     Mental Status:   Alert and oriented x 3-confused to situation, confused at times    IV Access:  - None    Nursing Mobility/ADLs:  Walking Dependent  Transfer  Dependent  Bathing  Dependent  Dressing  Dependent  Toileting  Dependent  Feeding  Assisted  Med Admin  Dependent  Med Delivery   whole    Wound Care Documentation and Therapy:  Puncture 06/18/20 Sacrum (Active)   Number of days: 608       Wound 02/16/22 Sacrum Medial (Active)   Dressing Status New dressing applied;Clean;Dry; Intact 02/16/22 0230   Wound Cleansed Not Cleansed 02/16/22 0230   Dressing/Treatment Foam 02/16/22 0230   Wound Length (cm) 3 cm 02/16/22 0230   Wound Width (cm) 2 cm 02/16/22 0230   Wound Depth (cm) 0.1 cm 02/16/22 0230   Wound Surface Area (cm^2) 6 cm^2 02/16/22 0230   Wound Volume (cm^3) 0.6 cm^3 02/16/22 0230   Wound Assessment Pink/red 02/16/22 0230   Drainage Amount Scant 02/16/22 0230   Drainage Description Yellow 02/16/22 0230   Number of days: 0        Elimination:  Continence: Bowel: No  Bladder: No  Urinary Catheter: None   Colostomy/Ileostomy/Ileal Conduit: No       Date of Last BM:  No intake or output data in the 24 hours ending 02/16/22 1012  No intake/output data recorded. Safety Concerns:     History of Falls (last 30 days) and At Risk for Falls    Impairments/Disabilities:      Vision and Hearing    Nutrition Therapy:  Current Nutrition Therapy:   - Oral Diet:  Carb Control 4 carbs/meal (1800kcals/day) and Low Sodium (2gm)    Routes of Feeding: Oral  Liquids: No Restrictions  Daily Fluid Restriction: yes - amount 1500ml  Last Modified Barium Swallow with Video (Video Swallowing Test): not done    Treatments at the Time of Hospital Discharge:   Respiratory Treatments:   Oxygen Therapy:  is not on home oxygen therapy.   Ventilator:    - No ventilator support    Rehab Therapies: {PT and OT and ST eval and treat  Weight Bearing Status/Restrictions: No weight bearing restirctions  Other Medical Equipment (for information only, NOT a DME order):  wheelchair  Other Treatments:     Patient's personal belongings (please select all that are sent with patient):  Glasses, Hearing Aides bilateral, Dentures {DESC; UPPER/LOWER/BOTH:88420}    RN SIGNATURE:  Electronically signed by Елена Calix RN on 2/18/22 at 1:35 PM EST    CASE MANAGEMENT/SOCIAL WORK SECTION    Inpatient Status Date: ***    Readmission Risk Assessment Score:  Readmission Risk              Risk of Unplanned Readmission:  0           Discharging to Facility/ Agency   Name: [address] [place] [address], [address] [place] [address]  OYX:[phone]    Dialysis Facility (if applicable)   Name:  Address:  Dialysis Schedule:  Phone:  Fax:    / signature: {Esignature:[id number]}Electronically signed by CURTIS Monte on 2/16/22 at 10:13 AM EST     PHYSICIAN SECTION    Prognosis: {Prognosis:[id number]}    Condition at Discharge: Stable    Rehab Potential (if transferring to Rehab): {Prognosis:[id number]}    Recommended Labs or Other Treatments After Discharge: ***    Physician Certification: I certify the above information and transfer of Gudelia Cleary  is necessary for the continuing treatment of the diagnosis listed and that she requires [facility] for Less than 30 days.      Update Admission H&P: {CHP DME Changes in IOMIA:[id number]}    PHYSICIAN SIGNATURE:  {Esignature:[id number]}

## 2022-02-16 NOTE — ED NOTES
Assumed care of patient. Vitals stable. Patient resting in bed, no complaints or concerns at this time.      Donny Roland RN  02/15/22 [id number]

## 2022-02-16 NOTE — PLAN OF CARE
Problem: Falls - Risk of:  Goal: Will remain free from falls  Description: Will remain free from falls  Outcome: Met This Shift  Goal: Absence of physical injury  Description: Absence of physical injury  Outcome: Met This Shift     Problem: Pain:  Goal: Pain level will decrease  Description: Pain level will decrease  Outcome: Met This Shift     Problem: Skin Integrity:  Goal: Will show no infection signs and symptoms  Description: Will show no infection signs and symptoms  Outcome: Ongoing  Goal: Absence of new skin breakdown  Description: Absence of new skin breakdown  Outcome: Ongoing     Problem: Falls - Risk of:  Goal: Will remain free from falls  Description: Will remain free from falls  Outcome: Met This Shift  Goal: Absence of physical injury  Description: Absence of physical injury  Outcome: Met This Shift     Problem: Pain:  Goal: Pain level will decrease  Description: Pain level will decrease  Outcome: Met This Shift

## 2022-02-16 NOTE — PROGRESS NOTES
RN clarified with pharmacy the need for verification on Amoxicillin R/T patient's report PCN allergy. Doctor contacted and clarified.

## 2022-02-16 NOTE — CARE COORDINATION
Patient presented to ED from facility due to increased confusion due to UTI. Baseline is minor confusion due to dementia. Rocephin given in ED. Found to have increasingly elevated troponin results. Cardiology consulted- admission orders completed. I am available through the night for any issues and the patient will be seen by Dr. Nicolas Piedra in the morning.

## 2022-02-16 NOTE — CARE COORDINATION
Social Work / Discharge Planning : Patient admitted from [facility]. SW spoke to manoj from [employer] and she verified patient is NOT a bed hold but can return at discharge. No pre-cert needed. Will need negative COVID. Await plan. N 17 generated and transport forms completed. SW to follow.  Electronically signed by CURTIS Townsend on 2/16/22 at 10:09 AM EST

## 2022-02-16 NOTE — CONSULTS
[facility] [address] [place] [address] CARDIOLOGY    Name: Liu Walker    Age: 80 y.o. Date of Admission: 2/15/2022  8:00 PM    Date of Service: 2/16/2022    Reason for Consultation: Elevated troponin    Referring Physician: Silviano Villalba    History of Present Illness: The patient is a 80y.o. year old female admitted from a facility due to increasing confusion felt secondary to UTI and potential urosepsis. Admission ECG showed left bundle branch block, unclear if no and troponin was elevated. Currently in no distress but unable to give any cogent history. Sinus rhythm on telemetry. Past Medical History:   Pertinent for hypertension, hyperlipidemia, non-insulin-requiring diabetes mellitus on review of the medical records and her medications. Review of Systems:  Unable to assess due to dementia.     Family History:  Family History   Problem Relation Age of Onset    High Blood Pressure Mother    [facility] Stroke Mother         CVA   [facility] Kidney Disease Mother    [facility] Other Son         Born with one kidney        Social History:  Social History     Socioeconomic History    Marital status:      Spouse name: Not on file    Number of children: Not on file    Years of education: Not on file    Highest education level: Not on file   Occupational History    Not on file   Tobacco Use    Smoking status: Never Smoker    Smokeless tobacco: Never Used   Vaping Use    Vaping Use: Never used   Substance and Sexual Activity    Alcohol use: Yes     Comment: rarely    Drug use: Never    Sexual activity: Not on file   Other Topics Concern    Not on file   Social History Narrative    Not on file     Social Determinants of Health     Financial Resource Strain:     Difficulty of Paying Living Expenses: Not on file   Food Insecurity:     Worried About [address] in the Last Year: Not on file    Denisse of Food in the Last Year: Not on file   Transportation Needs:     Lack of Transportation (Medical): Not on file    Lack of Transportation (Non-Medical): Not on file   Physical Activity:     Days of Exercise per Week: Not on file    Minutes of Exercise per Session: Not on file   Stress:     Feeling of Stress : Not on file   Social Connections:     Frequency of Communication with Friends and Family: Not on file    Frequency of Social Gatherings with Friends and Family: Not on file    Attends Muslim Services: Not on file    Active Member of [address] [place] [address] or Organizations: Not on file    Attends Club or Organization Meetings: Not on file    Marital Status: Not on file   Intimate Partner Violence:     Fear of Current or Ex-Partner: Not on file    Emotionally Abused: Not on file    Physically Abused: Not on file    Sexually Abused: Not on file   Housing Stability:     Unable to Pay for Housing in the Last Year: Not on file    Number of [place] in the Last Year: Not on file    Unstable Housing in the Last Year: Not on file       Allergies:   Allergies   Allergen Reactions    Iv Dye [Iodides] Hives    Pcn [Penicillins] Hives    Relafen [Nabumetone] Hives    Niaspan [Niacin Er]      Night sweats       Current Medications:  Current Facility-Administered Medications   Medication Dose Route Frequency Provider Last Rate Last Admin    ondansetron (ZOFRAN-ODT) disintegrating tablet 4 mg  4 mg Oral Q8H PRN Fernando Christen, APRN - NP        Or    ondansetron (ZOFRAN) injection 4 mg  4 mg IntraVENous Q6H PRN Fernando Christen, APRN - NP        acetaminophen (TYLENOL) tablet 650 mg  650 mg Oral Q6H PRN Fernando Christen, APRN - NP        Or   [address] [place] [address] acetaminophen (TYLENOL) suppository 650 mg  650 mg Rectal Q6H PRN Fernando Christen, APRN - NP        magnesium hydroxide (MILK OF MAGNESIA) 400 MG/5ML suspension 30 mL  30 mL Oral Daily PRN Fernando [place], APRN - NP        enoxaparin (LOVENOX) injection 40 mg  40 mg SubCUTAneous Daily Fernando Christen, APRN - NP        amLODIPine (NORVASC) tablet 5 mg  5 mg Oral BID NETO Cortes - JESENIA        hydroCHLOROthiazide (HYDRODIURIL) tablet 12.5 mg  12.5 mg Oral Daily NETO Cortes - JESENIA        lisinopril (PRINIVIL;ZESTRIL) tablet 20 mg  20 mg Oral Daily NETO Cortes - JESENIA        metoprolol succinate (TOPROL XL) extended release tablet 50 mg  50 mg Oral Daily NETO Cortes - NP        potassium chloride (KLOR-CON M) extended release tablet 20 mEq  20 mEq Oral Daily NETO Cortes - JESENIA        predniSONE (DELTASONE) tablet 5 mg  5 mg Oral Daily NETO Cortes - JESENIA        rosuvastatin (CRESTOR) tablet 20 mg  20 mg Oral Daily NETO Cortes - NP        Vitamin D (CHOLECALCIFEROL) tablet 1,000 Units  1,000 Units Oral BID NETO Cortes - NP        insulin lispro (HUMALOG) injection vial 0-12 Units  0-12 Units SubCUTAneous TID WC NETO Cortes - NP        insulin lispro (HUMALOG) injection vial 0-6 Units  0-6 Units SubCUTAneous Nightly NETO Cortes NP        glucose (GLUTOSE) 40 % oral gel 15 g  15 g Oral PRN NETO Cortes - NP        glucagon (rDNA) injection 1 mg  1 mg IntraMUSCular PRN NETO Cortes - NP        dextrose 5 % solution  100 mL/hr IntraVENous PRN NETO Cortes - NP        dextrose bolus (hypoglycemia) 10% 125 mL  125 mL IntraVENous PRN NETO Cortes NP        Or    dextrose bolus (hypoglycemia) 10% 250 mL  250 mL IntraVENous PRN NETO Cortes - JESENIA        perflutren lipid microspheres (DEFINITY) injection 1.65 mg  1.5 mL IntraVENous ONCE PRN Bryce Dang MD        aspirin EC tablet 325 mg  325 mg Oral Once MD Estefania Marrufo ON 2/17/2022] aspirin chewable tablet 81 mg  81 mg Oral Daily Bryce Dang MD          dextrose         Physical Exam:  /63   Pulse 86   Temp 98.4 °F (36.9 °C) (Oral)   Resp 16   Ht 5' 2\" (1.575 m)   Wt 150 lb (68 kg)   SpO2 97%   BMI 27.44 kg/m²   Weight change: Wt Readings from Last 3 Encounters:   02/16/22 150 lb (68 kg)   11/29/21 154 lb (69.9 kg)   06/28/21 154 lb (69.9 kg)         General: Awake, alert, oriented x3, no acute distress  HEENT: Unremarkable  Neck: No JVD or bruits. Cardiac: Regular rate and rhythm, normal S1 and S2, no extra heart sounds, murmurs, heaves, thrills  Resp: Lungs clear without wheezing or crackles. No accessory muscle use or retraction  Abdomen: soft, nontender, nondistended, no gross organomegaly or mass  Skin: Warm and dry, no cyanosis. Musculoskeletal: No identified joint deformity  Neuro: Grossly unremarkable  Psych: Cooperative, and normal affect    Intake/Output:  No intake or output data in the 24 hours ending 02/16/22 0612  No intake/output data recorded. Laboratory Tests:  Lab Results   Component Value Date    CREATININE 1.0 02/15/2022    BUN 15 02/15/2022     (L) 02/15/2022    K 3.6 02/15/2022    CL 94 (L) 02/15/2022    CO2 25 02/15/2022     No results for input(s): CKTOTAL, CKMB in the last 72 hours.     Invalid input(s): TROPONONI  Lab Results   Component Value Date    BNP 91 01/03/2022     Lab Results   Component Value Date    WBC 9.8 02/15/2022    RBC 3.73 02/15/2022    HGB 11.3 02/15/2022    HCT 33.8 02/15/2022    MCV 90.6 02/15/2022    MCH 30.3 02/15/2022    MCHC 33.4 02/15/2022    RDW 13.2 02/15/2022     02/15/2022    MPV 9.5 02/15/2022     Recent Labs     02/14/22  0817   ALKPHOS 78   ALT 14   AST 19   PROT 5.5*   BILITOT 1.0   LABALBU 2.8*     Lab Results   Component Value Date    MG 1.7 02/14/2022     Lab Results   Component Value Date    PROTIME 11.4 01/03/2022    INR 1.03 01/03/2022     Lab Results   Component Value Date    TSH 1.620 01/31/2022     No components found for: CHLPL  Lab Results   Component Value Date    TRIG 133 01/12/2022    TRIG 235 (H) 06/02/2020    TRIG 158 (H) 11/13/2019     Lab Results   Component Value Date    HDL 46 01/12/2022    HDL 51 06/02/2020    HDL 55 11/13/2019     Lab Results   Component Value Date    LDLCALC 65 01/12/2022    LDLCALC 52 06/02/2020    [address] 66 11/13/2019     Lab Results   Component Value Date    INR 1.03 01/03/2022       Admission ECG reviewed by me revealed sinus rhythm, left bundle branch block. ASSESSMENT / PLAN:    #1. Elevated troponinunclear etiology, potentially secondary to UTI/urosepsis, but certainly would expect that she has underlying coronary disease. Risk of stress testing which may result in heart catheterization is prohibitive in this 80year-old demented female and no plans for this. Continue Lovenox, beta-blocker, aspirin. Have cycled troponins to see trend. #2. Left bundle branch blockunclear if acute or chronic. No pacemaker currently appears necessary. Echocardiogram to assess left ventricular function. #3. Hypertensionblood pressure controlled. No new antihypertensives added. #4. Hyperlipidemiacontinue statin. #5. Diabetesper hospitalist.    #6. Outside of those recommendations noted above, no further inpatient cardiac recommendations are currently pending.     Electronically signed by Hilda Jiménez MD on 2/16/2022 at 6:12 AM

## 2022-02-17 LAB
ALBUMIN SERPL-MCNC: 3 G/DL (ref 3.5–5.2)
ALP BLD-CCNC: 106 U/L (ref 35–104)
ALT SERPL-CCNC: 17 U/L (ref 0–32)
ANION GAP SERPL CALCULATED.3IONS-SCNC: 13 MMOL/L (ref 7–16)
AST SERPL-CCNC: 27 U/L (ref 0–31)
BILIRUB SERPL-MCNC: 0.7 MG/DL (ref 0–1.2)
BUN BLDV-MCNC: 13 MG/DL (ref 6–23)
CALCIUM SERPL-MCNC: 8.6 MG/DL (ref 8.6–10.2)
CHLORIDE BLD-SCNC: 94 MMOL/L (ref 98–107)
CHOLESTEROL, TOTAL: 85 MG/DL (ref 0–199)
CO2: 24 MMOL/L (ref 22–29)
CREAT SERPL-MCNC: 1 MG/DL (ref 0.5–1)
GFR AFRICAN AMERICAN: >60
GFR NON-AFRICAN AMERICAN: 52 ML/MIN/1.73
GLUCOSE BLD-MCNC: 152 MG/DL (ref 74–99)
HBA1C MFR BLD: 8.5 % (ref 4–5.6)
HCT VFR BLD CALC: 32.5 % (ref 34–48)
HDLC SERPL-MCNC: 26 MG/DL
HEMOGLOBIN: 10.7 G/DL (ref 11.5–15.5)
LDL CHOLESTEROL CALCULATED: 30 MG/DL (ref 0–99)
LV EF: 53 %
LVEF MODALITY: NORMAL
MCH RBC QN AUTO: 30.3 PG (ref 26–35)
MCHC RBC AUTO-ENTMCNC: 32.9 % (ref 32–34.5)
MCV RBC AUTO: 92.1 FL (ref 80–99.9)
METER GLUCOSE: 130 MG/DL (ref 74–99)
METER GLUCOSE: 269 MG/DL (ref 74–99)
METER GLUCOSE: 270 MG/DL (ref 74–99)
METER GLUCOSE: 277 MG/DL (ref 74–99)
PDW BLD-RTO: 13.5 FL (ref 11.5–15)
PLATELET # BLD: 232 E9/L (ref 130–450)
PMV BLD AUTO: 9.4 FL (ref 7–12)
POTASSIUM REFLEX MAGNESIUM: 3.7 MMOL/L (ref 3.5–5)
RBC # BLD: 3.53 E12/L (ref 3.5–5.5)
SODIUM BLD-SCNC: 131 MMOL/L (ref 132–146)
TOTAL PROTEIN: 5.1 G/DL (ref 6.4–8.3)
TRIGL SERPL-MCNC: 146 MG/DL (ref 0–149)
VLDLC SERPL CALC-MCNC: 29 MG/DL
WBC # BLD: 8.7 E9/L (ref 4.5–11.5)

## 2022-02-17 PROCEDURE — 85027 COMPLETE CBC AUTOMATED: CPT

## 2022-02-17 PROCEDURE — 6370000000 HC RX 637 (ALT 250 FOR IP): Performed by: NURSE PRACTITIONER

## 2022-02-17 PROCEDURE — G0378 HOSPITAL OBSERVATION PER HR: HCPCS

## 2022-02-17 PROCEDURE — 6360000002 HC RX W HCPCS: Performed by: NURSE PRACTITIONER

## 2022-02-17 PROCEDURE — 83036 HEMOGLOBIN GLYCOSYLATED A1C: CPT

## 2022-02-17 PROCEDURE — 82962 GLUCOSE BLOOD TEST: CPT

## 2022-02-17 PROCEDURE — 6370000000 HC RX 637 (ALT 250 FOR IP): Performed by: STUDENT IN AN ORGANIZED HEALTH CARE EDUCATION/TRAINING PROGRAM

## 2022-02-17 PROCEDURE — 80061 LIPID PANEL: CPT

## 2022-02-17 PROCEDURE — 36415 COLL VENOUS BLD VENIPUNCTURE: CPT

## 2022-02-17 PROCEDURE — 93306 TTE W/DOPPLER COMPLETE: CPT

## 2022-02-17 PROCEDURE — 6370000000 HC RX 637 (ALT 250 FOR IP): Performed by: INTERNAL MEDICINE

## 2022-02-17 PROCEDURE — 80053 COMPREHEN METABOLIC PANEL: CPT

## 2022-02-17 RX ORDER — LANOLIN AND PETROLATUM 136.4; 469.9 MG/G; MG/G
OINTMENT TOPICAL 2 TIMES DAILY
Status: DISCONTINUED | OUTPATIENT
Start: 2022-02-17 | End: 2022-02-18 | Stop reason: HOSPADM

## 2022-02-17 RX ADMIN — Medication: at 17:31

## 2022-02-17 RX ADMIN — AMOXICILLIN AND CLAVULANATE POTASSIUM 1 TABLET: 875; 125 TABLET, FILM COATED ORAL at 20:55

## 2022-02-17 RX ADMIN — AMOXICILLIN AND CLAVULANATE POTASSIUM 1 TABLET: 875; 125 TABLET, FILM COATED ORAL at 09:14

## 2022-02-17 RX ADMIN — Medication 1000 UNITS: at 20:55

## 2022-02-17 RX ADMIN — METOPROLOL SUCCINATE 50 MG: 50 TABLET, EXTENDED RELEASE ORAL at 09:12

## 2022-02-17 RX ADMIN — AMLODIPINE BESYLATE 10 MG: 10 TABLET ORAL at 09:12

## 2022-02-17 RX ADMIN — INSULIN LISPRO 6 UNITS: 100 INJECTION, SOLUTION INTRAVENOUS; SUBCUTANEOUS at 11:44

## 2022-02-17 RX ADMIN — PREDNISONE 5 MG: 5 TABLET ORAL at 09:14

## 2022-02-17 RX ADMIN — INSULIN LISPRO 3 UNITS: 100 INJECTION, SOLUTION INTRAVENOUS; SUBCUTANEOUS at 20:58

## 2022-02-17 RX ADMIN — ACETAMINOPHEN 650 MG: 325 TABLET ORAL at 17:31

## 2022-02-17 RX ADMIN — HYDROCHLOROTHIAZIDE 25 MG: 25 TABLET ORAL at 09:13

## 2022-02-17 RX ADMIN — ROSUVASTATIN CALCIUM 20 MG: 20 TABLET, FILM COATED ORAL at 20:55

## 2022-02-17 RX ADMIN — INSULIN LISPRO 6 UNITS: 100 INJECTION, SOLUTION INTRAVENOUS; SUBCUTANEOUS at 16:53

## 2022-02-17 RX ADMIN — ASPIRIN 81 MG: 81 TABLET, CHEWABLE ORAL at 09:13

## 2022-02-17 RX ADMIN — Medication 1000 UNITS: at 09:13

## 2022-02-17 RX ADMIN — ENOXAPARIN SODIUM 40 MG: 100 INJECTION SUBCUTANEOUS at 09:11

## 2022-02-17 RX ADMIN — POTASSIUM CHLORIDE 20 MEQ: 750 TABLET, EXTENDED RELEASE ORAL at 09:13

## 2022-02-17 RX ADMIN — LISINOPRIL 20 MG: 20 TABLET ORAL at 09:14

## 2022-02-17 ASSESSMENT — PAIN DESCRIPTION - LOCATION: LOCATION: GENERALIZED

## 2022-02-17 ASSESSMENT — PAIN - FUNCTIONAL ASSESSMENT: PAIN_FUNCTIONAL_ASSESSMENT: PREVENTS OR INTERFERES SOME ACTIVE ACTIVITIES AND ADLS

## 2022-02-17 ASSESSMENT — PAIN SCALES - GENERAL
PAINLEVEL_OUTOF10: 3
PAINLEVEL_OUTOF10: 0
PAINLEVEL_OUTOF10: 5
PAINLEVEL_OUTOF10: 0

## 2022-02-17 ASSESSMENT — PAIN DESCRIPTION - PAIN TYPE: TYPE: CHRONIC PAIN

## 2022-02-17 ASSESSMENT — PAIN DESCRIPTION - DESCRIPTORS: DESCRIPTORS: ACHING;DISCOMFORT;SORE

## 2022-02-17 ASSESSMENT — PAIN DESCRIPTION - FREQUENCY: FREQUENCY: CONTINUOUS

## 2022-02-17 ASSESSMENT — PAIN DESCRIPTION - ONSET: ONSET: GRADUAL

## 2022-02-17 NOTE — CONSULTS
Comprehensive Nutrition Assessment    Type and Reason for Visit:  Initial,Consult    Nutrition Recommendations/Plan: Patient with increased nutrient needs for wound healing. Note patient on 1500mL fluid restriction. Will add Marito (180mL/serving) BID and continue to monitor. Nutrition Assessment:  Pt adm w/ increasing confusion w/ UTI/Elevated troponin. Note dementia/DM/CKD. Pt w/ increased nutrient needs for wound healing. Start Marito BID & monitor. Malnutrition Assessment:  Malnutrition Status: At risk for malnutrition (Comment)    Context:  Chronic Illness     Findings of the 6 clinical characteristics of malnutrition:  Energy Intake:  Mild decrease in energy intake (Comment)  Weight Loss:  Unable to assess (no actual wt hx per EMR)     Body Fat Loss:  No significant body fat loss     Muscle Mass Loss:  No significant muscle mass loss    Fluid Accumulation:  No significant fluid accumulation     Strength:  Not Performed    Estimated Daily Nutrient Needs:  Energy (kcal):  9830-4427; Weight Used for Energy Requirements:  Current     Protein (g):  ; Weight Used for Protein Requirements:  Ideal (1.3-1.5)        Fluid (ml/day):  1500; Method Used for Fluid Requirements:   (per diet order)      Nutrition Related Findings:  disoriented x 2, active BS, soft/round abd, trace edema, I/Os WNL      Wounds:  Pressure Injury,Multiple,Stage I,Stage II,Stage III,Wound Consult Pending       Current Nutrition Therapies:    ADULT DIET; Regular; 4 carb choices (60 gm/meal); Low Sodium (2 gm); 1500 ml    Anthropometric Measures:  · Height: 5' 2\" (157.5 cm)  · Current Body Weight: 150 lb (68 kg) (2/16 bed scale)   · Admission Body Weight: 150 lb (68 kg) (2/16 first taken bed scale)    · Usual Body Weight:  (no actual wt hx per EMR)     · Ideal Body Weight: 110 lbs; % Ideal Body Weight 136.4 %   · BMI: 27.4  · BMI Categories: Overweight (BMI 25.0-29. 9)       Nutrition Diagnosis:   · Increased nutrient needs related to increase demand for energy/nutrients as evidenced by wounds    Nutrition Interventions:   Food and/or Nutrient Delivery:  Continue Current Diet,Start Oral Nutrition Supplement (Marito BID)  Nutrition Education/Counseling:  Education not indicated   Coordination of Nutrition Care:  Continue to monitor while inpatient    Goals:  Pt consumes >50% meals/ONS       Nutrition Monitoring and Evaluation:   Behavioral-Environmental Outcomes:  None Identified   Food/Nutrient Intake Outcomes:  Food and Nutrient Intake,Supplement Intake  Physical Signs/Symptoms Outcomes:  Biochemical Data,Nutrition Focused Physical Findings,Weight,Chewing or Swallowing,Skin,GI Status,Fluid Status or Edema     Discharge Planning:     Too soon to determine     Electronically signed by Meaghan Buck, MS, RD, LD on 2/17/22 at 9:48 AM EST    Contact: [phone]

## 2022-02-17 NOTE — PROGRESS NOTES
Revisit. Heels pictures taken again. Not clear frm yesterday. Lo air loss bed in place  Rt heel. Remains soft, purple center with redness          Left heel now light purple, looks improved  Sandro Hicks.  Hilaria Arcos, CNS, Wound Care

## 2022-02-17 NOTE — PROGRESS NOTES
DAILY PROGRESS NOTE - [facility]    SUBJECTIVE:    She is being followed for incidentally noted elevated troponin in the setting of UTI. No current complaints of chest pain, worsening dyspnea, palpitations, syncope, presyncope. Echocardiogram is pending. Medical history pertinent for dementia, hypertension, hyperlipidemia, diabetes. Left bundle branch block noted on admission ECG. OBJECTIVE:    Her vital signs were reviewed today. Vitals:    02/17/22 0245   BP: (!) 109/55   Pulse: 73   Resp: 16   Temp: 98.6 °F (37 °C)   SpO2: 97%       Scheduled Meds:   enoxaparin  40 mg SubCUTAneous Daily    lisinopril  20 mg Oral Daily    metoprolol succinate  50 mg Oral Daily    potassium chloride  20 mEq Oral Daily    predniSONE  5 mg Oral Daily    rosuvastatin  20 mg Oral Daily    Vitamin D  1,000 Units Oral BID    insulin lispro  0-12 Units SubCUTAneous TID WC    insulin lispro  0-6 Units SubCUTAneous Nightly    aspirin  325 mg Oral Once    aspirin  81 mg Oral Daily    amoxicillin-clavulanate  1 tablet Oral 2 times per day    amLODIPine  10 mg Oral Daily    hydroCHLOROthiazide  25 mg Oral Daily     Continuous Infusions:   dextrose       PRN Meds:.ondansetron **OR** ondansetron, acetaminophen **OR** acetaminophen, magnesium hydroxide, glucose, glucagon (rDNA), dextrose, dextrose bolus (hypoglycemia) **OR** dextrose bolus (hypoglycemia), perflutren lipid microspheres, traMADol    REVIEW OF SYSTEMS:     Unable to obtain due to dementia. FAMILY HISTORY: Negative for CAD in first-degree relatives. SOCIAL HISTORY: Negative for alcohol, tobacco, or illicit drug use. PHYSICAL EXAM:    General Appearance:  no acute distress  Neck:  no bruits  Lungs:  Normal expansion. Clear to auscultation. No rales, rhonchi, or wheezing. Heart:  Heart sounds are normal.  Regular rate and rhythm without murmur, gallop or rub. Abdomen:  Soft, non-tender.   Extremities: Extremities warm to touch, pink, with no edema. Neuro/musculosketal:  Unremarkable. LABS:    Recent Labs     02/15/22  2137 02/16/22  0752 02/17/22  0315   * 135 131*   CREATININE 1.0 0.9 1.0       Recent Labs     02/15/22  2137 02/16/22  0752 02/17/22  0315   HGB 11.3* 11.4* 10.7*       No results for input(s): INR in the last 72 hours. IMPRESSION:    #1.  Elevated troponinadmission troponin around 82 with an increased to over 100 and now decreased to approximately 90. Potentially secondary to type II, or supply/demand mismatch, non-STEMI due to underlying urinary tract infection and increased metabolic needs (has stage III decubitus ulcer) but given age, would expect that she has underlying CAD. However, the risk of stress testing and potential heart catheterization given her dementia and age and frailty is prohibitive. Treat with baby aspirin daily. Already on beta-blocker metoprolol succinate. #2. Hypertensionblood pressure currently controlled. No new antihypertensives added. #3. Hyperlipidemiacontinue moderate dose Crestor. #4.  Diabetesper hospitalist.    #5.   Await echocardiogram and no further inpatient cardiac recommendations pending unless echocardiogram grossly abnormal.

## 2022-02-17 NOTE — PROGRESS NOTES
Internal Medicine Progress Note    Patient's name: Tavares Gonzalez  : 1928  Chief complaints (on day of admission): Altered Mental Status (EMS states [facility] staff reported \"increasing confusion all day\" and \"leaning this afternoon\", hx dementia)  Admission date: 2/15/2022  Date of service: 2022   Room: [address] INTERMEDIATE  Primary care physician: Reyes Hausen, MD  Reason for visit: Follow-up for chest pain    Subjective  Cisco Medrano was seen and examined at bedside     Confused/dementia at baseline today  She does not remember why she is in the hospital   This was explained to her gently but she was unable to comprehend   Has conversation with son yesterday   Awaiting ECHO to determine foucrse of treatment   DW nursing staff     Current treatment plan discussed and all questions answered    Current medications being prescribed discussed and patient expresses verbal understanding     Review of Systems  There are no new complaints of chest pain, shortness of breath, abdominal pain, nausea, vomiting, diarrhea, constipation unless otherwise mentioned above.      Hospital Medications  Current Facility-Administered Medications   Medication Dose Route Frequency Provider Last Rate Last Admin    ondansetron (ZOFRAN-ODT) disintegrating tablet 4 mg  4 mg Oral Q8H PRN Laureen Tram, APRN - NP        Or    ondansetron (ZOFRAN) injection 4 mg  4 mg IntraVENous Q6H PRN Laureen Tram, APRN - NP        acetaminophen (TYLENOL) tablet 650 mg  650 mg Oral Q6H PRN Laureen Tram, APRN - NP        Or   Mac acetaminophen (TYLENOL) suppository 650 mg  650 mg Rectal Q6H PRN Laureen Tram, APRN - NP        magnesium hydroxide (MILK OF MAGNESIA) 400 MG/5ML suspension 30 mL  30 mL Oral Daily PRN Laureen Tram, APRN - NP        enoxaparin (LOVENOX) injection 40 mg  40 mg SubCUTAneous Daily Laureen Tram, APRN - NP   40 mg at 22 1251    lisinopril (PRINIVIL;ZESTRIL) tablet 20 mg  20 mg Oral Daily Sarath Maya Marcia, APRN - NP   20 mg at 02/16/22 1256    metoprolol succinate (TOPROL XL) extended release tablet 50 mg  50 mg Oral Daily Valery Lobe, APRN - NP   50 mg at 02/16/22 1253    potassium chloride (KLOR-CON M) extended release tablet 20 mEq  20 mEq Oral Daily [facility] Lobe, APRN - NP   20 mEq at 02/16/22 1252    predniSONE (DELTASONE) tablet 5 mg  5 mg Oral Daily [facility] Lobe, APRN - NP   5 mg at 02/16/22 1254    rosuvastatin (CRESTOR) tablet 20 mg  20 mg Oral Daily [facility] Lobe, APRN - NP   20 mg at 02/16/22 2109    Vitamin D (CHOLECALCIFEROL) tablet 1,000 Units  1,000 Units Oral BID [facility] Lobe, APRN - NP   1,000 Units at 02/16/22 2109    insulin lispro (HUMALOG) injection vial 0-12 Units  0-12 Units SubCUTAneous TID WC [facility] Lobe, APRN - NP   4 Units at 02/16/22 1650    insulin lispro (HUMALOG) injection vial 0-6 Units  0-6 Units SubCUTAneous Nightly [facility] Lobe, APRN - NP   2 Units at 02/16/22 2107    glucose (GLUTOSE) 40 % oral gel 15 g  15 g Oral PRN [facility] Lobe, APRN - NP        glucagon (rDNA) injection 1 mg  1 mg IntraMUSCular PRN Valery Lobe, APRN - NP        dextrose 5 % solution  100 mL/hr IntraVENous PRN Valery Lobe, APRN - NP        dextrose bolus (hypoglycemia) 10% 125 mL  125 mL IntraVENous PRN [facility] Lobe, APRN - NP        Or    dextrose bolus (hypoglycemia) 10% 250 mL  250 mL IntraVENous PRN [facility] Lobe, APRN - NP        perflutren lipid microspheres (DEFINITY) injection 1.65 mg  1.5 mL IntraVENous ONCE PRN Eugmaximino Patches, MD        aspirin EC tablet 325 mg  325 mg Oral Once Eugune Patches, MD        aspirin chewable tablet 81 mg  81 mg Oral Daily Eugune Patches, MD        amoxicillin-clavulanate (AUGMENTIN) 875-125 MG per tablet 1 tablet  1 tablet Oral 2 times per day Lalita Dawson MD   1 tablet at 02/16/22 1424    traMADol (ULTRAM) tablet 50 mg  50 mg Oral Q6H PRN Lalita Dawson MD   50 mg at 02/16/22 1254    amLODIPine ([address]) tablet 10 mg  10 mg Oral Daily Sandra Goldman MD   10 mg at 02/16/22 1657    hydroCHLOROthiazide (HYDRODIURIL) tablet 25 mg  25 mg Oral Daily Sandra Goldman MD           PRN Medications  ondansetron **OR** ondansetron, acetaminophen **OR** acetaminophen, magnesium hydroxide, glucose, glucagon (rDNA), dextrose, dextrose bolus (hypoglycemia) **OR** dextrose bolus (hypoglycemia), perflutren lipid microspheres, traMADol    Objective  Most Recent Recorded Vitals  /61   Pulse 78   Temp 98.6 °F (37 °C) (Axillary)   Resp 18   Ht 5' 2\" (1.575 m)   Wt 150 lb (68 kg)   SpO2 94%   BMI 27.44 kg/m²   I/O last 3 completed shifts:  In: -   Out: 900 [Urine:900]  No intake/output data recorded. Physical Exam:  General: Demented, NAD   Eyes: conjunctivae/corneas clear, sclera non icteric  Skin: color/texture/turgor normal, no rashes or lesions  Lungs: CTAB, no retractions/use of accessory muscles, no vocal fremitus, no rhonchi, no crackle, no rales  Heart: regular rate, regular rhythm, no murmur  Abdomen: soft, NT, bowel sounds normal  Extremities: atraumatic, no edema  Neurologic: cranial nerves 2-12 grossly intact, no slurred speech    Most Recent Labs  Lab Results   Component Value Date    WBC 8.7 02/17/2022    HGB 10.7 (L) 02/17/2022    HCT 32.5 (L) 02/17/2022     02/17/2022     (L) 02/17/2022    K 3.7 02/17/2022    CL 94 (L) 02/17/2022    CREATININE 1.0 02/17/2022    BUN 13 02/17/2022    CO2 24 02/17/2022    GLUCOSE 152 (H) 02/17/2022    ALT 17 02/17/2022    AST 27 02/17/2022    INR 1.03 01/03/2022    TSH 1.620 01/31/2022    LABA1C 8.5 (H) 02/17/2022    LABMICR <12.0 06/02/2020       XR CHEST PORTABLE   Final Result   Hazy bibasilar opacities, likely atelectasis. However, superimposed   pneumonia/aspiration is not entirely excluded. CT Head WO Contrast   Final Result   No acute intracranial abnormality.       RECOMMENDATIONS:   Unavailable             Echocardiogram       Assessment Active Hospital Problems    Diagnosis     Elevated troponin [R77.8]     Chronic pain syndrome [G89.4]     Onychomycosis [B35.1]     Foot drop, right foot [M21.371]     PVD (peripheral vascular disease) ([facility]) [I73.9]     LBBB (left bundle branch block) [I44.7]     Vitamin B12 deficiency [E53.8]     Polymyalgia rheumatica (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) [N18.30]     Type II diabetes mellitus with peripheral circulatory disorder ([facility]) [E11.51]     Cervical disc disease [M50.90]     Chronic osteoarthritis [M19.90]     Anxiety [F41.9]          Plan  · Urinary tract infection  ? Enterococcus faecalis   ? Based on sensitivities, will will start patient on Augmentin  ? Monitor for Hives while on Penicillin   ? Discussed with son   ? Wound care nurse   · Sacral decubitus ulcer stage III  · WCN   · Monitor   · Elevated troponins   ? Continue to trend   ? Cardio on board   ? Conservative tx   ? Continue current regimen   · LBBB  ? ECHO ordered per cardio   ? Cardio on board  · Pulmonary HTN Moderate   · Moderate tricuspid regurgitation. · RVSP is 50-55 mmHg. · Diabetes Mellitus   ? POCT glucose   ? Hypoglycemia tx orders   ? ISS   ·   · PT AM-PAC-- TBD  · DVT prophylaxis   · Code status CCA  · Medications, labs and imaging reviewed   · Discharge plan: Plan for DC tomorrow back to [facility]    Electronically signed by Sharif Dockery MD on 2/17/2022 at 8:12 AM    I can be reached through [facility].

## 2022-02-18 VITALS
TEMPERATURE: 97.8 F | RESPIRATION RATE: 18 BRPM | BODY MASS INDEX: 27.6 KG/M2 | DIASTOLIC BLOOD PRESSURE: 70 MMHG | WEIGHT: 150 LBS | OXYGEN SATURATION: 96 % | HEART RATE: 77 BPM | SYSTOLIC BLOOD PRESSURE: 145 MMHG | HEIGHT: 62 IN

## 2022-02-18 LAB
ALBUMIN SERPL-MCNC: 3.2 G/DL (ref 3.5–5.2)
ALP BLD-CCNC: 117 U/L (ref 35–104)
ALT SERPL-CCNC: 24 U/L (ref 0–32)
ANION GAP SERPL CALCULATED.3IONS-SCNC: 14 MMOL/L (ref 7–16)
AST SERPL-CCNC: 42 U/L (ref 0–31)
BILIRUB SERPL-MCNC: 0.6 MG/DL (ref 0–1.2)
BUN BLDV-MCNC: 16 MG/DL (ref 6–23)
CALCIUM SERPL-MCNC: 9.5 MG/DL (ref 8.6–10.2)
CHLORIDE BLD-SCNC: 98 MMOL/L (ref 98–107)
CO2: 26 MMOL/L (ref 22–29)
CREAT SERPL-MCNC: 1 MG/DL (ref 0.5–1)
GFR AFRICAN AMERICAN: >60
GFR NON-AFRICAN AMERICAN: 52 ML/MIN/1.73
GLUCOSE BLD-MCNC: 170 MG/DL (ref 74–99)
HCT VFR BLD CALC: 35.1 % (ref 34–48)
HEMOGLOBIN: 11.7 G/DL (ref 11.5–15.5)
MCH RBC QN AUTO: 30.2 PG (ref 26–35)
MCHC RBC AUTO-ENTMCNC: 33.3 % (ref 32–34.5)
MCV RBC AUTO: 90.7 FL (ref 80–99.9)
METER GLUCOSE: 163 MG/DL (ref 74–99)
METER GLUCOSE: 260 MG/DL (ref 74–99)
ORGANISM: ABNORMAL
PDW BLD-RTO: 13.4 FL (ref 11.5–15)
PLATELET # BLD: 244 E9/L (ref 130–450)
PMV BLD AUTO: 10.5 FL (ref 7–12)
POTASSIUM REFLEX MAGNESIUM: 4 MMOL/L (ref 3.5–5)
RBC # BLD: 3.87 E12/L (ref 3.5–5.5)
SODIUM BLD-SCNC: 138 MMOL/L (ref 132–146)
TOTAL PROTEIN: 6.4 G/DL (ref 6.4–8.3)
URINE CULTURE, ROUTINE: ABNORMAL
WBC # BLD: 7.6 E9/L (ref 4.5–11.5)

## 2022-02-18 PROCEDURE — 6370000000 HC RX 637 (ALT 250 FOR IP): Performed by: NURSE PRACTITIONER

## 2022-02-18 PROCEDURE — 6370000000 HC RX 637 (ALT 250 FOR IP): Performed by: STUDENT IN AN ORGANIZED HEALTH CARE EDUCATION/TRAINING PROGRAM

## 2022-02-18 PROCEDURE — 82962 GLUCOSE BLOOD TEST: CPT

## 2022-02-18 PROCEDURE — 85027 COMPLETE CBC AUTOMATED: CPT

## 2022-02-18 PROCEDURE — 6360000002 HC RX W HCPCS: Performed by: NURSE PRACTITIONER

## 2022-02-18 PROCEDURE — G0378 HOSPITAL OBSERVATION PER HR: HCPCS

## 2022-02-18 PROCEDURE — 80053 COMPREHEN METABOLIC PANEL: CPT

## 2022-02-18 PROCEDURE — 36415 COLL VENOUS BLD VENIPUNCTURE: CPT

## 2022-02-18 PROCEDURE — 6370000000 HC RX 637 (ALT 250 FOR IP): Performed by: INTERNAL MEDICINE

## 2022-02-18 RX ORDER — AMLODIPINE BESYLATE 10 MG/1
10 TABLET ORAL DAILY
Qty: 30 TABLET | Refills: 3 | DISCHARGE
Start: 2022-02-18

## 2022-02-18 RX ORDER — AMOXICILLIN AND CLAVULANATE POTASSIUM 875; 125 MG/1; MG/1
1 TABLET, FILM COATED ORAL EVERY 12 HOURS SCHEDULED
Qty: 20 TABLET | Refills: 0 | DISCHARGE
Start: 2022-02-18 | End: 2022-02-28

## 2022-02-18 RX ORDER — HYDROCHLOROTHIAZIDE 25 MG/1
25 TABLET ORAL DAILY
Qty: 30 TABLET | Refills: 3 | DISCHARGE
Start: 2022-02-18

## 2022-02-18 RX ADMIN — INSULIN LISPRO 6 UNITS: 100 INJECTION, SOLUTION INTRAVENOUS; SUBCUTANEOUS at 12:45

## 2022-02-18 RX ADMIN — AMLODIPINE BESYLATE 10 MG: 10 TABLET ORAL at 08:31

## 2022-02-18 RX ADMIN — Medication 1000 UNITS: at 08:31

## 2022-02-18 RX ADMIN — POTASSIUM CHLORIDE 20 MEQ: 750 TABLET, EXTENDED RELEASE ORAL at 08:31

## 2022-02-18 RX ADMIN — PREDNISONE 5 MG: 5 TABLET ORAL at 08:31

## 2022-02-18 RX ADMIN — ENOXAPARIN SODIUM 40 MG: 100 INJECTION SUBCUTANEOUS at 08:30

## 2022-02-18 RX ADMIN — Medication: at 06:21

## 2022-02-18 RX ADMIN — INSULIN LISPRO 2 UNITS: 100 INJECTION, SOLUTION INTRAVENOUS; SUBCUTANEOUS at 08:36

## 2022-02-18 RX ADMIN — AMOXICILLIN AND CLAVULANATE POTASSIUM 1 TABLET: 875; 125 TABLET, FILM COATED ORAL at 08:36

## 2022-02-18 RX ADMIN — METOPROLOL SUCCINATE 50 MG: 50 TABLET, EXTENDED RELEASE ORAL at 08:32

## 2022-02-18 RX ADMIN — HYDROCHLOROTHIAZIDE 25 MG: 25 TABLET ORAL at 08:31

## 2022-02-18 RX ADMIN — LISINOPRIL 20 MG: 20 TABLET ORAL at 08:31

## 2022-02-18 RX ADMIN — ASPIRIN 81 MG: 81 TABLET, CHEWABLE ORAL at 08:31

## 2022-02-18 ASSESSMENT — PAIN SCALES - GENERAL: PAINLEVEL_OUTOF10: 0

## 2022-02-18 NOTE — DISCHARGE SUMMARY
Internal Medicine Discharge Summary    NAME: Tavares Gonzalez :  1928  MRN:  [id number] PCP:Kelly Enriquez MD    ADMITTED: 2/15/2022   DISCHARGED: 2022  2:09 PM    ADMITTING PHYSICIAN: Liset att. providers found    PCP: Reyes Hausen, MD    CONSULTANT(S):   IP CONSULT TO INTERNAL MEDICINE  IP CONSULT TO CARDIOLOGY  IP CONSULT TO DIETITIAN     ADMITTING DIAGNOSIS:   Elevated troponin [R77.8]  Urinary tract infection without hematuria, site unspecified [N39.0]     Please see H&P for further details    DISCHARGE DIAGNOSES:   Active Hospital Problems    Diagnosis     Elevated troponin [R77.8]     Chronic pain syndrome [G89.4]     Onychomycosis [B35.1]     Foot drop, right foot [M21.371]     PVD (peripheral vascular disease) ([address] [I73.9]     LBBB (left bundle branch block) [I44.7]     Vitamin B12 deficiency [E53.8]     Polymyalgia rheumatica (HCC) [M35.3]     Vitamin D deficiency [E55.9]     Nontoxic multinodular goiter [E04.2]     Hypertension [I10]     Hyperlipidemia [E78.5]     Osteopenia [M85.80]     CKD (chronic kidney disease) stage 3, GFR 30-59 ml/min (HCC) [N18.30]     Type II diabetes mellitus with peripheral circulatory disorder (HCC) [E11.51]     Cervical disc disease [M50.90]     Chronic osteoarthritis [M19.90]     Anxiety [F41.9]        BRIEF HISTORY OF PRESENT ILLNESS: Tavares Gonzalez is a 80 y.o. female patient of Reyes Hausen, MD who  has a past medical history of Anxiety, Chronic kidney disease, Decreased bone density, Foot drop, right foot, Hyperlipidemia, Hypertension, Intervertebral disc disorder, Lumbar stenosis, Nontoxic multinodular goiter, Osteoarthritis, Osteopenia, Polymyalgia rheumatica ([address] TIA (transient ischemic attack), Type 2 diabetes mellitus ([address] and Vitamin D deficiency. who originally had concerns including Altered Mental Status ([facility] states [place] staff reported \"increasing confusion all day\" and \"leaning this afternoon\", hx dementia).  at presentation on 2/15/2022, and was found to have Elevated troponin [R77.8]  Urinary tract infection without hematuria, site unspecified [N39.0] after workup. Please see H&P for further details. HOSPITAL COURSE:   The patient presented to the hospital with the chief complaint of Altered Mental Status (EMS states [place] staff reported \"increasing confusion all day\" and \"leaning this afternoon\", hx dementia)  . The patient was admitted to the hospital.     · Urinary tract infection  ? Enterococcus faecalis   ? Based on sensitivities, will will start patient on Augmentin  ? Monitor for Hives while on Penicillin   ? Discussed with son   ? Wound care nurse   · Sacral decubitus ulcer stage III  ? WCN   ? Monitor   · Elevated troponins   ? Continue to trend   ? Cardio on board   ? Conservative tx   ? Continue current regimen   · LBBB  ? ECHO ordered per cardio   ? Cardio on board  · Pulmonary HTN Moderate   ? Moderate tricuspid regurgitation. ? RVSP is 50-55 mmHg. · Diabetes Mellitus   ? POCT glucose   ? Hypoglycemia tx orders   ? ISS   [facility] back to [facility]       BRIEF PHYSICAL EXAMINATION AND LABORATORIES ON DAY OF DISCHARGE:  VITALS:  BP (!) 145/70   Pulse 77   Temp 97.8 °F (36.6 °C) (Oral)   Resp 18   Ht 5' 2\" (1.575 m)   Wt 150 lb (68 kg)   SpO2 96%   BMI 27.44 kg/m²       Please see note from the same day.      LABS[de-identified]  Recent Labs     02/16/22 0752 02/17/22 0315 02/18/22  0420    131* 138   K 3.5 3.7 4.0   CL 97* 94* 98   CO2 25 24 26   BUN 13 13 16   CREATININE 0.9 1.0 1.0   GLUCOSE 207* 152* 170*   CALCIUM 8.9 8.6 9.5     Recent Labs     02/16/22 0752 02/17/22 0315 02/18/22  0420   ALKPHOS 97 106* 117*   ALT 19 17 24   AST 25 27 42*   PROT 5.9* 5.1* 6.4   BILITOT 0.6 0.7 0.6   LABALBU 3.2* 3.0* 3.2*     Recent Labs     02/16/22 0752 02/17/22 0315 02/18/22  0420   WBC 8.5 8.7 7.6   RBC 3.83 3.53 3.87   HGB 11.4* 10.7* 11.7   HCT 34.6 32.5* 35.1   MCV 90.3 92.1 90.7   MCH 29.8 30.3 30.2 MCHC 32.9 32.9 33.3   RDW 13.4 13.5 13.4    232 244   MPV 9.3 9.4 10.5     Lab Results   Component Value Date    LABA1C 8.5 (H) 02/17/2022    LABA1C 7.9 (H) 01/12/2022    LABA1C 7.2 (H) 01/04/2022     Lab Results   Component Value Date    INR 1.03 01/03/2022    PROTIME 11.4 01/03/2022      Lab Results   Component Value Date    TSH 1.620 01/31/2022    TSH 0.47 01/04/2022    TSH 1.220 06/02/2020     Lab Results   Component Value Date    TRIG 146 02/17/2022    TRIG 133 01/12/2022    TRIG 235 (H) 06/02/2020    HDL 26 02/17/2022    HDL 46 01/12/2022    HDL 51 06/02/2020    LDLCALC 30 02/17/2022    LDLCALC 65 01/12/2022    LDLCALC 52 06/02/2020     Recent Labs     02/16/22  0752   MG 2.1       No results for input(s): CKTOTAL, CKMB, TROPONINI in the last 72 hours. Recent Labs     02/15/22  2137   LACTA 1.5       IMAGING:  Echo Complete    Result Date: 2/17/2022  Transthoracic Echocardiography Report (TTE)  Demographics   Patient Name      [facility]         Gender              Female                    [address] Record    [id number]         Room Number         1537  Number   Account #         [de-identified]        Procedure Date      02/17/2022   Corporate ID                       Ordering Physician  Ana Negron MD   Accession Number  [id number]       Referring Physician Enrrique Gonzalez   Date of Birth     12/02/1928       Sonographer         Dilia Villanueva                                                         [facility]   Age               80 year(s)       Interpreting        The [address] [place] [address]                                     Physician           Ana Negron MD                                      Any Other  Procedure Type of Study   TTE procedure:Echo Complete W/Doppler & Color Flow. Procedure Date Date: 02/17/2022 Start: 11:20 AM Study Location: Portable Technical Quality: Adequate visualization Indications:Left bundle branch block.  Patient Status: Routine Height: 62 inches Weight: 150 pounds BSA: 1.69 m^2 BMI: 27.44 kg/m^2 HR: 66 bpm BP: 139/63 mmHg  Findings   Left Ventricle  Normal left ventricle size. Mild left ventricular concentric hypertrophy noted. Abnormal (paradoxical) motion consistent with left bundle branch block. Low normal ejection fraction. Ejection fraction is visually estimated at 50-55%. Indeterminate diastolic function. Right Ventricle  Mildly dilated right ventricle. 3.9 cm  Right ventricle global systolic function is low normal to mildly reduced. TAPSE 1.5. Left Atrium  The left atrium is mildly dilated. Right Atrium  Mildly enlarged right atrium size. Mitral Valve  Calcification of the mitral valve noted. Mitral annular calcification is present. No evidence of mitral valve stenosis. Moderate mitral regurgitation is present. Tricuspid Valve  The tricuspid valve appears structurally normal.  Moderate tricuspid regurgitation. RVSP is 50-55 mmHg. Moderate pulmonary hypertension. Aortic Valve  Individual aortic valve leaflets are not clearly visualized. No hemodynamically significant aortic stenosis is present. No evidence of aortic valve regurgitation. Pulmonic Valve  Pulmonic valve is structurally normal.  No evidence of pulmonic valve stenosis. No evidence of any pulmonic regurgitation. Pericardial Effusion  No evidence of pericardial effusion. Pleural Effusion  No evidence of pleural effusion. Aorta  Aortic root dimension within normal limits.    Conclusions   Signature   ----------------------------------------------------------------  Electronically signed by Katharine Swan MD(Interpreting  physician) on 02/17/2022 12:38 PM  ----------------------------------------------------------------  M-Mode/2D Measurements & Calculations   LV Diastolic    LV Systolic Dimension: 3.3   AV Cusp Separation: 1.5 cmLA  Dimension: 4.5  cm                           Dimension: 4.2 cmAO Root  cm              LV Volume Diastolic: 04.7 ml Dimension: 2.6 cm  LV FS:26.7 %    LV Volume Systolic: 45.0 ml  LV PW           LV EDV/LV EDV Index: 26.9  Diastolic: 1.3  AA/65 UV/P^7AR ESV/LV ESV  cm              Index: 43.7 ml/26ml/ m^2     RV Diastolic Dimension: 3.1  LV PW Systolic: EF Calculated: 37.7 %        cm  1.4 cm          LV Mass Index: 139 l/min*m^2  Septum          LV Length: 7.1 cm            LA/Aorta: 9.64  Diastolic: 1.4                               Ascending Aorta: 3.2 cm  cm              LVOT: 2.1 cm                 LA volume/Index: 54 ml  Septum                                       /00.35QE/Z^6  Systolic: 1.6                                RA Area: 13.8 cm^2  cm  CO: 4.07 l/min                               IVC Expiration: 2.5 cm  CI: 2.41  l/m*m^2  LV Mass: 235.33  g  Doppler Measurements & Calculations   MV Peak E-Wave: 0.87 AV Peak Velocity:     LVOT Peak Velocity: 0.96 m/s  m/s                  1.62 m/s              LVOT Mean Velocity: 0.62 m/s  MV Peak A-Wave: 1.16 AV Peak Gradient:     LVOT Peak Gradient: 3.7  m/s                  10.48 mmHg            mmHgLVOT Mean Gradient: 1.8  MV E/A Ratio: 0.75   AV Mean Velocity:     mmHg  MV Peak Gradient:    1.14 m/s  7.2 mmHg             AV Mean Gradient: 5.7  MV Mean Gradient:    mmHg  2.4 mmHg             AV VTI: 29.9 cm       TR Velocity:2.55 m/s  MV Mean Velocity:    AV Area               TR Gradient:26.05 mmHg  0.71 m/s             (Continuity):2.06     PV Peak Velocity: 0.8 m/s  MV Deceleration      cm^2                  PV Peak Gradient: 2.55 mmHg  Time: 228.7 msec                           PV Mean Velocity: 0.56 m/s  MV P1/2t: 49.6 msec  LVOT VTI: 17.8 cm     PV Mean Gradient: 1.4 mmHg  MVA by PHT:4.44 cm^2 IVRT: 147.6 msec  MV Area  (continuity): 1.7  cm^2  MV E' Septal  Velocity: 0.04 m/s  MV E' Lateral  Velocity: 7 m/s  [email or web address] [id number]    CT Head WO Contrast    Result Date: 2/15/2022  EXAMINATION: CT OF THE HEAD WITHOUT CONTRAST  2/15/2022 9:03 pm TECHNIQUE: CT of the head was performed without the administration of intravenous contrast. Dose modulation, iterative reconstruction, and/or weight based adjustment of the mA/kV was utilized to reduce the radiation dose to as low as reasonably achievable. COMPARISON: None. HISTORY: ORDERING SYSTEM PROVIDED HISTORY: AMS TECHNOLOGIST PROVIDED HISTORY: Reason for exam:->AMS Has a \"code stroke\" or \"stroke alert\" been called? ->No Decision Support Exception - unselect if not a suspected or confirmed emergency medical condition->Emergency Medical Condition (MA) FINDINGS: BRAIN/VENTRICLES:  No mass effect, edema or hemorrhage is seen. Moderate volume loss is seen in the brain with mild-to-moderate chronic microvascular ischemic changes. No hydrocephalus or extra-axial fluid is seen. ORBITS: The visualized portion of the orbits demonstrate no acute abnormality. SINUSES: The visualized paranasal sinuses and mastoid air cells demonstrate no acute abnormality. SOFT TISSUES/SKULL:  No acute abnormality of the visualized skull or soft tissues. No acute intracranial abnormality. RECOMMENDATIONS: Unavailable     XR CHEST PORTABLE    Result Date: 2/15/2022  EXAMINATION: ONE XRAY VIEW OF THE CHEST 2/15/2022 10:07 pm COMPARISON: None. HISTORY: ORDERING SYSTEM PROVIDED HISTORY: r/o pneumonia TECHNOLOGIST PROVIDED HISTORY: Reason for exam:->r/o pneumonia FINDINGS: The cardiomediastinal silhouette is within normal limits. There are hazy bibasilar opacities. No pneumothorax or pleural effusion. Hazy bibasilar opacities, likely atelectasis. However, superimposed pneumonia/aspiration is not entirely excluded. MICROBIOLOGY:  BLOOD CX #1  No results for input(s): BC in the last 72 hours. BLOOD CX #2  No results for input(s): Luis Eduardo Newport in the last 72 hours. TIP CULTURE  No results for input(s): CXCATHTIP in the last 72 hours.    CULTURE, RESPIRATORY   No results for input(s): CULTRESP in the last 72 hours. RESPIRATORY SMEAR  No results for input(s): RESPSMEAR in the last 72 hours. ECHO:      DISPOSITION:  The patient's condition is fair. The patient is being discharged to nursing home    DISCHARGE MEDICATIONS:      Medication List      START taking these medications    amoxicillin-clavulanate 875-125 MG per tablet  Commonly known as: AUGMENTIN  Take 1 tablet by mouth every 12 hours for 10 days        CHANGE how you take these medications    amLODIPine 10 MG tablet  Commonly known as: NORVASC  Take 1 tablet by mouth daily  What changed:   · medication strength  · how much to take  · when to take this     hydroCHLOROthiazide 25 MG tablet  Commonly known as: HYDRODIURIL  Take 1 tablet by mouth daily  What changed:   · medication strength  · how much to take        CONTINUE taking these medications    ALPRAZolam 0.25 MG tablet  Commonly known as: XANAX     aspirin 81 MG chewable tablet     BD Pen Needle Jazlyn U/F 32G X 4 MM Misc  Generic drug: Insulin Pen Needle     CALCIUM 1200 PO     Co Q 10 10 MG Caps     diclofenac sodium 1 % Gel  Commonly known as: VOLTAREN     Glucosamine-Chondroitin 2340-9417 MG/30ML Liqd     insulin glargine 100 UNIT/ML injection pen  Commonly known as: Basaglar KwikPen  Inject 20 Units into the skin nightly     insulin lispro 200 UNIT/ML Sopn pen  Commonly known as: HumaLOG KwikPen  14units q am     lidocaine 5 % ointment  Commonly known as: XYLOCAINE  APPLY TWO GRAMS EXTERNALLY TO PAIN AREA FOUR TIMES A DAY. MAX OF 8 GRAMS PER DAY     lidocaine-prilocaine 2.5-2.5 % cream  Commonly known as: EMLA  APPLY ONE GRAM EXTERNALLY THREE TIMES A DAY IF NEEDED-MUST LAST 30 DAYS     lisinopril 20 MG tablet  Commonly known as: PRINIVIL;ZESTRIL  Take 1 tablet by mouth daily     metFORMIN 500 MG tablet  Commonly known as: GLUCOPHAGE     metoprolol succinate 50 MG extended release tablet  Commonly known as:  Toprol XL  Take 1 tablet by mouth daily     potassium chloride 10 MEQ extended release capsule  Commonly known as: MICRO-K  3 po daily     rosuvastatin 20 MG tablet  Commonly known as: Crestor  Take 1 tablet by mouth daily     Tylenol 8 Hour 650 MG extended release tablet  Generic drug: acetaminophen     VersaPro Crea  APPLY ONE GRAM (ONE PUMP) EXTERNALLY FOUR TIMES A DAY     vitamin B-12 1000 MCG tablet  Commonly known as: CYANOCOBALAMIN     vitamin D 25 MCG (1000 UT) Caps     VSL#3 PO        ASK your doctor about these medications    predniSONE 5 MG tablet  Commonly known as: Brandi Berkowitz           Where to Get Your Medications      Information about where to get these medications is not yet available    Ask your nurse or doctor about these medications  · amLODIPine 10 MG tablet  · amoxicillin-clavulanate 875-125 MG per tablet  · hydroCHLOROthiazide 25 MG tablet         Discharge Medication List as of 2/18/2022  1:51 PM      CONTINUE these medications which have CHANGED    Details   amLODIPine (NORVASC) 10 MG tablet Take 1 tablet by mouth daily, Disp-30 tablet, R-3DC to [facility]      hydroCHLOROthiazide (HYDRODIURIL) 25 MG tablet Take 1 tablet by mouth daily, Disp-30 tablet, R-3DC to [facility]           Discharge Medication List as of 2/18/2022  1:51 PM        Discharge Medication List as of 2/18/2022  1:51 PM      START taking these medications    Details   amoxicillin-clavulanate (AUGMENTIN) 875-125 MG per tablet Take 1 tablet by mouth every 12 hours for 10 days, Disp-20 tablet, R-0DC to [facility]             INTERNAL MEDICINE FOLLOW UP/INSTRUCTIONS:  · Follow-up with primary care physician within 1 week of discharge from hospital  · Please review changes to pre-hospital admission medications and prescriptions for new medications upon discharge from the hospital with PCP  · Please review results of labs and imaging studies with PCP  · Follow-up with consultants as directed by them   · If recurrence or worsening of symptoms please call primary care physician or return to the ER immediately  · Diet: ADULT DIET; Regular; 4 carb choices (60 gm/meal); Low Sodium (2 gm); 1500 ml    Preparing for this patient's discharge, including paperwork, orders, instructions, and meeting with patient did required >35 minutes.     Electronically signed by Ravin Garcia MD on 2/18/2022 at 2:43 PM

## 2022-02-18 NOTE — CARE COORDINATION
Social Work / Discharge Planning : [facility] has a [facility]. Jenise Ni will transport at 2:00 . Son Edmra Snider updated as well as patient and daughter in law who is visiting in the room. Eleonora from [facility] updated. SW to follow. . Electronically signed by CURTIS French on 2/18/22 at 9:53 AM EST

## 2022-02-18 NOTE — PROGRESS NOTES
Internal Medicine Progress Note    Patient's name: Iglesia Valera  : 1928  Chief complaints (on day of admission): Altered Mental Status (EMS states [facility] [place] [facility] staff reported \"increasing confusion all day\" and \"leaning this afternoon\", hx dementia)  Admission date: 2/15/2022  Date of service: 2022   Room: [address] INTERMEDIATE  Primary care physician: Chrsitelle Kam MD  Reason for visit: Follow-up for chest pain    Subjective  Terrell Avila was seen and examined at bedside     Confused/dementia at baseline today. This is baseline for her. Conversation and questioning attempted with the patient without appropriate responses. Patient does appear comfortable and in no acute distress however. Discussed with nursing staff. Current treatment plan discussed and all questions answered    Current medications being prescribed discussed and patient expresses verbal understanding     Review of Systems  There are no new complaints of chest pain, shortness of breath, abdominal pain, nausea, vomiting, diarrhea, constipation unless otherwise mentioned above.      Hospital Medications  Current Facility-Administered Medications   Medication Dose Route Frequency Provider Last Rate Last Admin    lanolin-petrolatum (A+D) ointment   Topical BID Betty Garcia MD   Given at 22 5157    ondansetron (ZOFRAN-ODT) disintegrating tablet 4 mg  4 mg Oral Q8H PRN Evone Hew, APRN - NP        Or    ondansetron [facility]) injection 4 mg  4 mg IntraVENous Q6H PRN Evone Hew, APRN - NP        acetaminophen (TYLENOL) tablet 650 mg  650 mg Oral Q6H PRN Evone Hew, APRN - NP   650 mg at 22 1731    Or    acetaminophen (TYLENOL) suppository 650 mg  650 mg Rectal Q6H PRN Evone Hew, APRN - NP        magnesium hydroxide (MILK OF MAGNESIA) 400 MG/5ML suspension 30 mL  30 mL Oral Daily PRN Evone Hew, APRN - NP        enoxaparin (LOVENOX) injection 40 mg  40 mg SubCUTAneous Daily Evone Hew, APRN - NP   40 mg at 02/18/22 0830    lisinopril (PRINIVIL;ZESTRIL) tablet 20 mg  20 mg Oral Daily Marcelle Ashley, APRN - NP   20 mg at 02/18/22 0831    metoprolol succinate (TOPROL XL) extended release tablet 50 mg  50 mg Oral Daily Marcelle Ashley, APRN - NP   50 mg at 02/18/22 3026    potassium chloride (KLOR-CON M) extended release tablet 20 mEq  20 mEq Oral Daily Marcelle Ashley, APRN - NP   20 mEq at 02/18/22 0831    predniSONE (DELTASONE) tablet 5 mg  5 mg Oral Daily Marcelle Ashley, APRN - NP   5 mg at 02/18/22 0831    rosuvastatin (CRESTOR) tablet 20 mg  20 mg Oral Daily Marcelle Ashley, APRN - NP   20 mg at 02/17/22 2055    Vitamin D (CHOLECALCIFEROL) tablet 1,000 Units  1,000 Units Oral BID Marcelle Ashley, APRN - NP   1,000 Units at 02/18/22 0831    insulin lispro (HUMALOG) injection vial 0-12 Units  0-12 Units SubCUTAneous TID WC Marcelle Ashley, APRN - NP   2 Units at 02/18/22 0836    insulin lispro (HUMALOG) injection vial 0-6 Units  0-6 Units SubCUTAneous Nightly Marcelle Ashley, APRN - NP   3 Units at 02/17/22 2058    glucose (GLUTOSE) 40 % oral gel 15 g  15 g Oral PRN Marcelle Ashley, APRN - NP        glucagon (rDNA) injection 1 mg  1 mg IntraMUSCular PRN Marcelle Ashley, APRN - NP        dextrose 5 % solution  100 mL/hr IntraVENous PRN Marcelle Ashley, APRN - NP        dextrose bolus (hypoglycemia) 10% 125 mL  125 mL IntraVENous PRN Marcelle Ashley, APRN - NP        Or    dextrose bolus (hypoglycemia) 10% 250 mL  250 mL IntraVENous PRN Marcelle Ashley, APRN - NP        perflutren lipid microspheres (DEFINITY) injection 1.65 mg  1.5 mL IntraVENous ONCE PRN Brennon Cantu MD        aspirin EC tablet 325 mg  325 mg Oral Once Brennon Cantu MD        aspirin chewable tablet 81 mg  81 mg Oral Daily Brennon Cantu MD   81 mg at 02/18/22 0807    amoxicillin-clavulanate (AUGMENTIN) 875-125 MG per tablet 1 tablet  1 tablet Oral 2 times per day King Eduard MD   1 tablet at 02/18/22 4084  traMADol (ULTRAM) tablet 50 mg  50 mg Oral Q6H PRN Marga Pallas, MD   50 mg at 02/16/22 1254    amLODIPine (NORVASC) tablet 10 mg  10 mg Oral Daily Demarcus Ervin MD   10 mg at 02/18/22 0831    hydroCHLOROthiazide (HYDRODIURIL) tablet 25 mg  25 mg Oral Daily Demarcus Ervin MD   25 mg at 02/18/22 0831       PRN Medications  ondansetron **OR** ondansetron, acetaminophen **OR** acetaminophen, magnesium hydroxide, glucose, glucagon (rDNA), dextrose, dextrose bolus (hypoglycemia) **OR** dextrose bolus (hypoglycemia), perflutren lipid microspheres, traMADol    Objective  Most Recent Recorded Vitals  BP (!) 145/70   Pulse 77   Temp 97.8 °F (36.6 °C) (Oral)   Resp 18   Ht 5' 2\" (1.575 m)   Wt 150 lb (68 kg)   SpO2 96%   BMI 27.44 kg/m²   I/O last 3 completed shifts:  In: -   Out: 2100 [Urine:2100]  No intake/output data recorded. Physical Exam:  General: Demented, NAD   Eyes: conjunctivae/corneas clear, sclera non icteric  Skin: color/texture/turgor normal, no rashes or lesions  Lungs: CTAB, no retractions/use of accessory muscles, no vocal fremitus, no rhonchi, no crackle, no rales  Heart: regular rate, regular rhythm, no murmur  Abdomen: soft, NT, bowel sounds normal  Extremities: atraumatic, no edema  Neurologic: cranial nerves 2-12 grossly intact, no slurred speech    Most Recent Labs  Lab Results   Component Value Date    WBC 7.6 02/18/2022    HGB 11.7 02/18/2022    HCT 35.1 02/18/2022     02/18/2022     02/18/2022    K 4.0 02/18/2022    CL 98 02/18/2022    CREATININE 1.0 02/18/2022    BUN 16 02/18/2022    CO2 26 02/18/2022    GLUCOSE 170 (H) 02/18/2022    ALT 24 02/18/2022    AST 42 (H) 02/18/2022    INR 1.03 01/03/2022    TSH 1.620 01/31/2022    LABA1C 8.5 (H) 02/17/2022    LABMICR <12.0 06/02/2020       XR CHEST PORTABLE   Final Result   Hazy bibasilar opacities, likely atelectasis. However, superimposed   pneumonia/aspiration is not entirely excluded.          CT Head WO Contrast Final Result   No acute intracranial abnormality. RECOMMENDATIONS:   Unavailable             Echocardiogram       Assessment   Active Hospital Problems    Diagnosis     Elevated troponin [R77.8]     Chronic pain syndrome [G89.4]     Onychomycosis [B35.1]     Foot drop, right foot [M21.371]     PVD (peripheral vascular disease) ([facility]) [I73.9]     LBBB (left bundle branch block) [I44.7]     Vitamin B12 deficiency [E53.8]     Polymyalgia rheumatica ([facility]) [M35.3]     Vitamin D deficiency [E55.9]     Nontoxic multinodular goiter [E04.2]     Hypertension [I10]     Hyperlipidemia [E78.5]     Osteopenia [M85.80]     CKD (chronic kidney disease) stage 3, GFR 30-59 ml/min ([facility]) [N18.30]     Type II diabetes mellitus with peripheral circulatory disorder ([facility]) [E11.51]     Cervical disc disease [M50.90]     Chronic osteoarthritis [M19.90]     Anxiety [F41.9]          Plan  · Urinary tract infection  ? Enterococcus faecalis   ? Based on sensitivities, will will start patient on Augmentin  ? Monitor for Hives while on Penicillin   ? Discussed with son   ? Wound care nurse   · Sacral decubitus ulcer stage III  · WCN   · Monitor   · Elevated troponins   ? Continue to trend   ? Cardio on board   ? Conservative tx   ? Continue current regimen   · LBBB  ? ECHO ordered per cardio   ? Cardio on board  · Pulmonary HTN Moderate   · Moderate tricuspid regurgitation. · RVSP is 50-55 mmHg. · Diabetes Mellitus   ? POCT glucose   ? Hypoglycemia tx orders   ? ISS   ·   · PT AM-PAC-- TBD  · DVT prophylaxis   · Code status CCA  · Medications, labs and imaging reviewed   · Discharge plan: Plan for DC today to [facility]     Electronically signed by Shaila Rod MD on 2/18/2022 at 11:21 AM    I can be reached through [address] [place] [address]. Addendum: I have personally participated in a face-to-face history and physical exam on the date of service with the patient. I have discussed the case with the resident.  I also participated in medical decision making with the resident on the date of service and I agree with all of the pertinent clinical information unless indicated in my editing of the note. I have reviewed and edited the note above based on my findings during my history, exam, and decision making on the same day of service. The vitals, labs, imaging, medications and treatment plan were reviewed independently by myself in addition to with the resident doctor. I agree with the above documentation and treatment plan     Electronically signed by Nivia Narvaez MD on 2/18/2022 at 12:07 PM    I can be reached through [facility].

## 2022-02-21 LAB
ALBUMIN SERPL-MCNC: 3.1 G/DL (ref 3.5–5.2)
ALP BLD-CCNC: 90 U/L (ref 35–104)
ALT SERPL-CCNC: 15 U/L (ref 0–32)
ANION GAP SERPL CALCULATED.3IONS-SCNC: 13 MMOL/L (ref 7–16)
AST SERPL-CCNC: 18 U/L (ref 0–31)
BASOPHILS ABSOLUTE: 0.08 E9/L (ref 0–0.2)
BASOPHILS RELATIVE PERCENT: 1.1 % (ref 0–2)
BILIRUB SERPL-MCNC: 0.4 MG/DL (ref 0–1.2)
BUN BLDV-MCNC: 20 MG/DL (ref 6–23)
CALCIUM SERPL-MCNC: 9.5 MG/DL (ref 8.6–10.2)
CHLORIDE BLD-SCNC: 97 MMOL/L (ref 98–107)
CO2: 25 MMOL/L (ref 22–29)
CREAT SERPL-MCNC: 0.8 MG/DL (ref 0.5–1)
EOSINOPHILS ABSOLUTE: 0.17 E9/L (ref 0.05–0.5)
EOSINOPHILS RELATIVE PERCENT: 2.3 % (ref 0–6)
GFR AFRICAN AMERICAN: >60
GFR NON-AFRICAN AMERICAN: >60 ML/MIN/1.73
GLUCOSE BLD-MCNC: 191 MG/DL (ref 74–99)
HCT VFR BLD CALC: 37.7 % (ref 34–48)
HEMOGLOBIN: 11.9 G/DL (ref 11.5–15.5)
IMMATURE GRANULOCYTES #: 0.18 E9/L
IMMATURE GRANULOCYTES %: 2.5 % (ref 0–5)
LYMPHOCYTES ABSOLUTE: 1.39 E9/L (ref 1.5–4)
LYMPHOCYTES RELATIVE PERCENT: 19 % (ref 20–42)
MCH RBC QN AUTO: 29.3 PG (ref 26–35)
MCHC RBC AUTO-ENTMCNC: 31.6 % (ref 32–34.5)
MCV RBC AUTO: 92.9 FL (ref 80–99.9)
MONOCYTES ABSOLUTE: 0.64 E9/L (ref 0.1–0.95)
MONOCYTES RELATIVE PERCENT: 8.8 % (ref 2–12)
NEUTROPHILS ABSOLUTE: 4.84 E9/L (ref 1.8–7.3)
NEUTROPHILS RELATIVE PERCENT: 66.3 % (ref 43–80)
PDW BLD-RTO: 13.5 FL (ref 11.5–15)
PLATELET # BLD: 294 E9/L (ref 130–450)
PMV BLD AUTO: 9.8 FL (ref 7–12)
POTASSIUM SERPL-SCNC: 4.1 MMOL/L (ref 3.5–5)
RBC # BLD: 4.06 E12/L (ref 3.5–5.5)
SODIUM BLD-SCNC: 135 MMOL/L (ref 132–146)
TOTAL PROTEIN: 5.9 G/DL (ref 6.4–8.3)
WBC # BLD: 7.3 E9/L (ref 4.5–11.5)

## 2022-02-22 LAB
ANION GAP SERPL CALCULATED.3IONS-SCNC: 15 MMOL/L (ref 7–16)
BUN BLDV-MCNC: 18 MG/DL (ref 6–23)
CALCIUM SERPL-MCNC: 9.5 MG/DL (ref 8.6–10.2)
CHLORIDE BLD-SCNC: 99 MMOL/L (ref 98–107)
CO2: 23 MMOL/L (ref 22–29)
CREAT SERPL-MCNC: 0.9 MG/DL (ref 0.5–1)
GFR AFRICAN AMERICAN: >60
GFR NON-AFRICAN AMERICAN: 58 ML/MIN/1.73
GLUCOSE BLD-MCNC: 87 MG/DL (ref 74–99)
POTASSIUM SERPL-SCNC: 3.8 MMOL/L (ref 3.5–5)
SODIUM BLD-SCNC: 137 MMOL/L (ref 132–146)

## 2022-02-28 LAB
ALBUMIN SERPL-MCNC: 3 G/DL (ref 3.5–5.2)
ALP BLD-CCNC: 89 U/L (ref 35–104)
ALT SERPL-CCNC: 9 U/L (ref 0–32)
ANION GAP SERPL CALCULATED.3IONS-SCNC: 12 MMOL/L (ref 7–16)
AST SERPL-CCNC: 27 U/L (ref 0–31)
BASOPHILS ABSOLUTE: 0.08 E9/L (ref 0–0.2)
BASOPHILS RELATIVE PERCENT: 0.9 % (ref 0–2)
BILIRUB SERPL-MCNC: 0.4 MG/DL (ref 0–1.2)
BUN BLDV-MCNC: 17 MG/DL (ref 6–23)
CALCIUM SERPL-MCNC: 9.5 MG/DL (ref 8.6–10.2)
CHLORIDE BLD-SCNC: 100 MMOL/L (ref 98–107)
CO2: 23 MMOL/L (ref 22–29)
CREAT SERPL-MCNC: 0.7 MG/DL (ref 0.5–1)
EOSINOPHILS ABSOLUTE: 0.22 E9/L (ref 0.05–0.5)
EOSINOPHILS RELATIVE PERCENT: 2.6 % (ref 0–6)
GFR AFRICAN AMERICAN: >60
GFR NON-AFRICAN AMERICAN: >60 ML/MIN/1.73
GLUCOSE BLD-MCNC: 83 MG/DL (ref 74–99)
HCT VFR BLD CALC: 37.2 % (ref 34–48)
HEMOGLOBIN: 12 G/DL (ref 11.5–15.5)
IMMATURE GRANULOCYTES #: 0.12 E9/L
IMMATURE GRANULOCYTES %: 1.4 % (ref 0–5)
LYMPHOCYTES ABSOLUTE: 1.7 E9/L (ref 1.5–4)
LYMPHOCYTES RELATIVE PERCENT: 20 % (ref 20–42)
MCH RBC QN AUTO: 29.6 PG (ref 26–35)
MCHC RBC AUTO-ENTMCNC: 32.3 % (ref 32–34.5)
MCV RBC AUTO: 91.9 FL (ref 80–99.9)
MONOCYTES ABSOLUTE: 0.64 E9/L (ref 0.1–0.95)
MONOCYTES RELATIVE PERCENT: 7.5 % (ref 2–12)
NEUTROPHILS ABSOLUTE: 5.75 E9/L (ref 1.8–7.3)
NEUTROPHILS RELATIVE PERCENT: 67.6 % (ref 43–80)
PDW BLD-RTO: 13.7 FL (ref 11.5–15)
PLATELET # BLD: 275 E9/L (ref 130–450)
PMV BLD AUTO: 10.8 FL (ref 7–12)
POTASSIUM SERPL-SCNC: 4.5 MMOL/L (ref 3.5–5)
RBC # BLD: 4.05 E12/L (ref 3.5–5.5)
SODIUM BLD-SCNC: 135 MMOL/L (ref 132–146)
TOTAL PROTEIN: 6.2 G/DL (ref 6.4–8.3)
WBC # BLD: 8.5 E9/L (ref 4.5–11.5)

## 2022-03-18 PROBLEM — R77.8 ELEVATED TROPONIN: Status: RESOLVED | Noted: 2022-02-16 | Resolved: 2022-03-18

## 2022-03-18 PROBLEM — R79.89 ELEVATED TROPONIN: Status: RESOLVED | Noted: 2022-02-16 | Resolved: 2022-03-18

## 2022-03-21 LAB
ALBUMIN SERPL-MCNC: 3 G/DL (ref 3.5–5.2)
ALP BLD-CCNC: 68 U/L (ref 35–104)
ALT SERPL-CCNC: 7 U/L (ref 0–32)
ANION GAP SERPL CALCULATED.3IONS-SCNC: 11 MMOL/L (ref 7–16)
AST SERPL-CCNC: 11 U/L (ref 0–31)
BASOPHILS ABSOLUTE: 0.04 E9/L (ref 0–0.2)
BASOPHILS RELATIVE PERCENT: 0.6 % (ref 0–2)
BILIRUB SERPL-MCNC: 0.3 MG/DL (ref 0–1.2)
BUN BLDV-MCNC: 14 MG/DL (ref 6–23)
CALCIUM SERPL-MCNC: 9 MG/DL (ref 8.6–10.2)
CHLORIDE BLD-SCNC: 94 MMOL/L (ref 98–107)
CO2: 28 MMOL/L (ref 22–29)
CREAT SERPL-MCNC: 0.7 MG/DL (ref 0.5–1)
EOSINOPHILS ABSOLUTE: 0.12 E9/L (ref 0.05–0.5)
EOSINOPHILS RELATIVE PERCENT: 1.7 % (ref 0–6)
GFR AFRICAN AMERICAN: >60
GFR NON-AFRICAN AMERICAN: >60 ML/MIN/1.73
GLUCOSE BLD-MCNC: 90 MG/DL (ref 74–99)
HCT VFR BLD CALC: 32.8 % (ref 34–48)
HEMOGLOBIN: 10.9 G/DL (ref 11.5–15.5)
IMMATURE GRANULOCYTES #: 0.05 E9/L
IMMATURE GRANULOCYTES %: 0.7 % (ref 0–5)
LYMPHOCYTES ABSOLUTE: 2.1 E9/L (ref 1.5–4)
LYMPHOCYTES RELATIVE PERCENT: 29.2 % (ref 20–42)
MCH RBC QN AUTO: 29.1 PG (ref 26–35)
MCHC RBC AUTO-ENTMCNC: 33.2 % (ref 32–34.5)
MCV RBC AUTO: 87.7 FL (ref 80–99.9)
MONOCYTES ABSOLUTE: 0.66 E9/L (ref 0.1–0.95)
MONOCYTES RELATIVE PERCENT: 9.2 % (ref 2–12)
NEUTROPHILS ABSOLUTE: 4.23 E9/L (ref 1.8–7.3)
NEUTROPHILS RELATIVE PERCENT: 58.6 % (ref 43–80)
PDW BLD-RTO: 13.4 FL (ref 11.5–15)
PLATELET # BLD: 229 E9/L (ref 130–450)
PMV BLD AUTO: 9.9 FL (ref 7–12)
POTASSIUM SERPL-SCNC: 3.2 MMOL/L (ref 3.5–5)
RBC # BLD: 3.74 E12/L (ref 3.5–5.5)
SODIUM BLD-SCNC: 133 MMOL/L (ref 132–146)
TOTAL PROTEIN: 5.6 G/DL (ref 6.4–8.3)
WBC # BLD: 7.2 E9/L (ref 4.5–11.5)

## 2025-03-14 ENCOUNTER — OUTSIDE SERVICES (OUTPATIENT)
Dept: FAMILY MEDICINE CLINIC | Age: 89
End: 2025-03-14

## 2025-03-14 DIAGNOSIS — I25.10 ASHD (ARTERIOSCLEROTIC HEART DISEASE): ICD-10-CM

## 2025-03-14 DIAGNOSIS — M19.90 CHRONIC OSTEOARTHRITIS: ICD-10-CM

## 2025-03-14 DIAGNOSIS — I73.9 PVD (PERIPHERAL VASCULAR DISEASE): ICD-10-CM

## 2025-03-14 DIAGNOSIS — E78.2 MIXED HYPERLIPIDEMIA: ICD-10-CM

## 2025-03-14 DIAGNOSIS — M81.0 AGE-RELATED OSTEOPOROSIS WITHOUT CURRENT PATHOLOGICAL FRACTURE: ICD-10-CM

## 2025-03-14 DIAGNOSIS — E11.51 TYPE II DIABETES MELLITUS WITH PERIPHERAL CIRCULATORY DISORDER (HCC): ICD-10-CM

## 2025-03-14 DIAGNOSIS — F41.9 ANXIETY DISORDER, UNSPECIFIED TYPE: ICD-10-CM

## 2025-03-14 DIAGNOSIS — R60.0 PERIPHERAL EDEMA: Primary | ICD-10-CM

## 2025-03-14 DIAGNOSIS — I10 PRIMARY HYPERTENSION: ICD-10-CM

## 2025-03-29 PROBLEM — I25.10 ASHD (ARTERIOSCLEROTIC HEART DISEASE): Status: ACTIVE | Noted: 2025-03-29

## 2025-03-29 PROBLEM — R60.0 PERIPHERAL EDEMA: Status: ACTIVE | Noted: 2025-03-29

## 2025-03-29 RX ORDER — LIDOCAINE 40 MG/G
CREAM TOPICAL PRN
COMMUNITY

## 2025-03-29 RX ORDER — MUPIROCIN 20 MG/G
OINTMENT TOPICAL 2 TIMES DAILY
COMMUNITY

## 2025-03-29 RX ORDER — LISINOPRIL 10 MG/1
10 TABLET ORAL DAILY
COMMUNITY

## 2025-03-29 RX ORDER — ALPRAZOLAM 0.25 MG
0.25 TABLET ORAL 3 TIMES DAILY
COMMUNITY

## 2025-03-29 RX ORDER — POTASSIUM CHLORIDE 1500 MG/1
20 TABLET, EXTENDED RELEASE ORAL 2 TIMES DAILY
COMMUNITY

## 2025-03-29 RX ORDER — ALPRAZOLAM 0.5 MG
0.5 TABLET ORAL 2 TIMES DAILY PRN
COMMUNITY

## 2025-03-29 RX ORDER — MIRTAZAPINE 15 MG/1
7.5 TABLET, ORALLY DISINTEGRATING ORAL NIGHTLY
COMMUNITY

## 2025-03-29 RX ORDER — MIRTAZAPINE 15 MG/1
15 TABLET, FILM COATED ORAL NIGHTLY
COMMUNITY

## 2025-03-29 RX ORDER — INSULIN GLARGINE 100 [IU]/ML
18 INJECTION, SOLUTION SUBCUTANEOUS NIGHTLY
COMMUNITY

## 2025-03-29 ASSESSMENT — ENCOUNTER SYMPTOMS
SORE THROAT: 0
BLOOD IN STOOL: 0
CHEST TIGHTNESS: 0
WHEEZING: 0
ABDOMINAL PAIN: 0
ABDOMINAL DISTENTION: 0
VOMITING: 0
EYE REDNESS: 0
PHOTOPHOBIA: 0
DIARRHEA: 0
VOICE CHANGE: 0
EYE DISCHARGE: 0
NAUSEA: 0
CHOKING: 0
SINUS PRESSURE: 0
SHORTNESS OF BREATH: 0
COUGH: 0
FACIAL SWELLING: 0
RHINORRHEA: 0
SINUS PAIN: 0
ANAL BLEEDING: 0
CONSTIPATION: 0
EYE ITCHING: 0
STRIDOR: 0
EYE PAIN: 0
COLOR CHANGE: 0
APNEA: 0
TROUBLE SWALLOWING: 0
RECTAL PAIN: 0

## 2025-03-29 NOTE — PROGRESS NOTES
3/14/25  Negra Finney : 1928 Sex: female  Age: 89 y.o.    HPI   Patient is seen today at [facility] as a relatively new patient into the practice inasmuch as patient is changing physicians as it is becoming more difficult to see her primary care physician Dr. Escalante in her office.  Patient most recently had been seen and evaluated per Dr. Escalante and subsequently referred to podiatry inasmuch as there had been a cellulitis of the right foot, distal right hallux.  Patient at the time was given the benefit of appropriate imaging studies and appropriate debridement, having been started on a course of doxycycline therapies.    Patient at this point in time remains alert and oriented as to time, place, and person, although is a bit repetitive from time to time.  Patient primarily wheelchair-bound.  Staffing notes no major behavioral issues in terms of aggressive, combative, or disruptive behaviors.  There has been no recent falls or orthostatic changes, no syncopal events or overt seizure activity.  Patient's history is significant inasmuch as patient does present with an ongoing history of arteriosclerotic heart disease in addition to hypertension, hyperlipidemia, and type II insulin-dependent diabetes mellitus complicated by some degree of peripheral vascular disease.  Patient's history furthermore includes  chronic osteoarthritis with osteoporosis and anxiety disorder.  Staffing notes that the patient has done reasonably well in this regard although does present with what appears to be some increasing peripheral edema about the lower extremities despite hydrochlorothiazide therapies.  There has been no serosanguineous seepage, drainage, or other abnormalities.  There has been no significant erythema or tenderness per se, no recent trauma.  There has been no fever or chills, no chest pain or obvious respiratory distress, inasmuch as patient is saturating on room air.  There has

## 2025-03-30 PROBLEM — M81.0 AGE-RELATED OSTEOPOROSIS WITHOUT CURRENT PATHOLOGICAL FRACTURE: Status: ACTIVE | Noted: 2025-03-30

## 2025-03-30 RX ORDER — AMOXICILLIN 250 MG
1 CAPSULE ORAL 2 TIMES DAILY
COMMUNITY

## 2025-03-30 RX ORDER — NYSTATIN AND TRIAMCINOLONE ACETONIDE 100000; 1 [USP'U]/G; MG/G
CREAM TOPICAL EVERY 12 HOURS
COMMUNITY

## 2025-03-30 RX ORDER — ESTRADIOL 0.1 MG/G
1 CREAM VAGINAL
COMMUNITY

## 2025-03-30 RX ORDER — TAMSULOSIN HYDROCHLORIDE 0.4 MG/1
0.4 CAPSULE ORAL NIGHTLY
COMMUNITY

## 2025-03-30 RX ORDER — PREDNISONE 5 MG/1
5 TABLET ORAL DAILY
COMMUNITY

## 2025-04-10 ENCOUNTER — OUTSIDE SERVICES (OUTPATIENT)
Dept: FAMILY MEDICINE CLINIC | Age: 89
End: 2025-04-10

## 2025-04-10 DIAGNOSIS — R60.0 PERIPHERAL EDEMA: ICD-10-CM

## 2025-04-10 DIAGNOSIS — M25.562 ACUTE PAIN OF LEFT KNEE: Primary | ICD-10-CM

## 2025-04-10 DIAGNOSIS — I10 PRIMARY HYPERTENSION: ICD-10-CM

## 2025-04-10 DIAGNOSIS — E11.51 TYPE II DIABETES MELLITUS WITH PERIPHERAL CIRCULATORY DISORDER (HCC): ICD-10-CM

## 2025-04-10 DIAGNOSIS — M19.90 CHRONIC OSTEOARTHRITIS: ICD-10-CM

## 2025-04-10 DIAGNOSIS — E78.2 MIXED HYPERLIPIDEMIA: ICD-10-CM

## 2025-04-10 DIAGNOSIS — F41.9 ANXIETY DISORDER, UNSPECIFIED TYPE: ICD-10-CM

## 2025-04-10 DIAGNOSIS — I73.9 PVD (PERIPHERAL VASCULAR DISEASE): ICD-10-CM

## 2025-04-10 DIAGNOSIS — I25.10 ASHD (ARTERIOSCLEROTIC HEART DISEASE): ICD-10-CM

## 2025-04-26 PROBLEM — M25.562 ACUTE PAIN OF LEFT KNEE: Status: ACTIVE | Noted: 2025-04-26

## 2025-04-26 PROBLEM — M25.561 ACUTE PAIN OF RIGHT KNEE: Status: ACTIVE | Noted: 2025-04-26

## 2025-04-26 RX ORDER — LIDOCAINE 40 MG/G
CREAM TOPICAL
COMMUNITY

## 2025-04-26 RX ORDER — LOPERAMIDE HYDROCHLORIDE 2 MG/1
2 CAPSULE ORAL 4 TIMES DAILY PRN
COMMUNITY

## 2025-04-26 RX ORDER — ACETAMINOPHEN 325 MG/1
650 TABLET ORAL EVERY 4 HOURS PRN
COMMUNITY

## 2025-04-26 ASSESSMENT — ENCOUNTER SYMPTOMS
DIARRHEA: 0
COLOR CHANGE: 0
VOICE CHANGE: 0
WHEEZING: 0
SORE THROAT: 0
APNEA: 0
RHINORRHEA: 0
BACK PAIN: 1
SHORTNESS OF BREATH: 0
VOMITING: 0
SINUS PAIN: 0
CHOKING: 0
CHEST TIGHTNESS: 0
EYE PAIN: 0
NAUSEA: 0
SINUS PRESSURE: 0
EYE ITCHING: 0
RECTAL PAIN: 0
TROUBLE SWALLOWING: 0
COUGH: 0
EYE REDNESS: 0
ABDOMINAL DISTENTION: 0
FACIAL SWELLING: 0
BLOOD IN STOOL: 0
CONSTIPATION: 0
EYE DISCHARGE: 0
ANAL BLEEDING: 0
STRIDOR: 0
ABDOMINAL PAIN: 0
PHOTOPHOBIA: 0

## 2025-04-26 NOTE — PROGRESS NOTES
4/10/25  Negra Finney : 1928 Sex: female  Age: 89 y.o.    HPI   Patient is seen today at [facility] for follow-up evaluation inasmuch as patient does have an ongoing history of type 2 insulin-dependent diabetes mellitus with peripheral vascular disease.  Patient's most recent A1c in months past was noted at 7.4% with sugar screens throughout the past several weeks ranging between 150 and 250.  There has been no episodic hypoglycemia per se, patient currently on metformin and long-acting Lantus therapies in addition to sliding scale therapies as noted.  Patient's history includes arteriosclerotic heart disease with hypertension, in addition to venous insufficiency with peripheral edema.  History furthermore includes chronic osteoarthritis, although in this regard patient does acknowledge some increasing pain and discomfort about the left knee throughout the past several days.  There has been no recent trauma; there is been no radicular pain, paralysis, or paresthesias.  Patient's history furthermore includes  hyperlipidemia and chronic anxiety disorder.  Patient by alysone remains fairly alert and oriented as to time, place, and person, although somewhat vague and repetitive from time to time.  Staffing does some degree of episodic confusion.  There has been no major behavioral issues in terms of aggressive, combative, or disruptive behaviors.  Patient remains a bed to chair confined, is capable of self propel with respect to wheelchair, and does transfer with some degree of assist.  There has been no recent falls or orthostatic changes, no syncopal events or overt seizure activity.  There has been no fever or chills, no chest pain or obvious respiratory distress, inasmuch as patient is saturating on room air.  There has been no clinical desaturations, cough, or pleuritic pain.  There has been no evidence of nausea or vomiting, no abdominal pain or christel genitourinary

## 2025-05-27 ENCOUNTER — OUTSIDE SERVICES (OUTPATIENT)
Dept: FAMILY MEDICINE CLINIC | Age: 89
End: 2025-05-27

## 2025-05-27 DIAGNOSIS — I73.9 PVD (PERIPHERAL VASCULAR DISEASE): ICD-10-CM

## 2025-05-27 DIAGNOSIS — M19.90 CHRONIC OSTEOARTHRITIS: ICD-10-CM

## 2025-05-27 DIAGNOSIS — E11.51 TYPE II DIABETES MELLITUS WITH PERIPHERAL CIRCULATORY DISORDER (HCC): ICD-10-CM

## 2025-05-27 DIAGNOSIS — R60.0 PERIPHERAL EDEMA: ICD-10-CM

## 2025-05-27 DIAGNOSIS — M25.551 RIGHT HIP PAIN: ICD-10-CM

## 2025-05-27 DIAGNOSIS — F32.9 MAJOR DEPRESSIVE DISORDER WITH CURRENT ACTIVE EPISODE, UNSPECIFIED DEPRESSION EPISODE SEVERITY, UNSPECIFIED WHETHER RECURRENT: ICD-10-CM

## 2025-05-27 DIAGNOSIS — M25.562 ACUTE PAIN OF LEFT KNEE: Primary | ICD-10-CM

## 2025-05-27 DIAGNOSIS — E78.2 MIXED HYPERLIPIDEMIA: ICD-10-CM

## 2025-05-27 DIAGNOSIS — F03.B18 MODERATE DEMENTIA WITH OTHER BEHAVIORAL DISTURBANCE, UNSPECIFIED DEMENTIA TYPE (HCC): ICD-10-CM

## 2025-05-27 DIAGNOSIS — I25.10 ASHD (ARTERIOSCLEROTIC HEART DISEASE): ICD-10-CM

## 2025-05-27 DIAGNOSIS — I10 PRIMARY HYPERTENSION: ICD-10-CM

## 2025-06-22 RX ORDER — AMOXICILLIN 250 MG
1 CAPSULE ORAL 2 TIMES DAILY
COMMUNITY

## 2025-06-22 RX ORDER — MIRTAZAPINE 15 MG/1
7.5 TABLET, FILM COATED ORAL NIGHTLY
COMMUNITY

## 2025-06-22 RX ORDER — ACETAMINOPHEN 325 MG/1
325 TABLET ORAL EVERY 4 HOURS PRN
COMMUNITY

## 2025-06-22 RX ORDER — INSULIN ASPART 100 [IU]/ML
10-16 INJECTION, SOLUTION INTRAVENOUS; SUBCUTANEOUS
COMMUNITY

## 2025-06-24 ENCOUNTER — OUTSIDE SERVICES (OUTPATIENT)
Dept: FAMILY MEDICINE CLINIC | Age: 89
End: 2025-06-24

## 2025-06-24 DIAGNOSIS — I73.9 PVD (PERIPHERAL VASCULAR DISEASE): ICD-10-CM

## 2025-06-24 DIAGNOSIS — R60.0 PERIPHERAL EDEMA: ICD-10-CM

## 2025-06-24 DIAGNOSIS — I10 PRIMARY HYPERTENSION: ICD-10-CM

## 2025-06-24 DIAGNOSIS — G89.4 CHRONIC PAIN SYNDROME: Chronic | ICD-10-CM

## 2025-06-24 DIAGNOSIS — E11.69 TYPE 2 DIABETES MELLITUS WITH OTHER SPECIFIED COMPLICATION, WITH LONG-TERM CURRENT USE OF INSULIN (HCC): ICD-10-CM

## 2025-06-24 DIAGNOSIS — F03.B11 MODERATE DEMENTIA WITH AGITATION, UNSPECIFIED DEMENTIA TYPE (HCC): Primary | ICD-10-CM

## 2025-06-24 DIAGNOSIS — Z79.4 TYPE 2 DIABETES MELLITUS WITH OTHER SPECIFIED COMPLICATION, WITH LONG-TERM CURRENT USE OF INSULIN (HCC): ICD-10-CM

## 2025-06-24 DIAGNOSIS — E78.2 MIXED HYPERLIPIDEMIA: ICD-10-CM

## 2025-06-24 DIAGNOSIS — M25.551 RIGHT HIP PAIN: ICD-10-CM

## 2025-06-24 DIAGNOSIS — M19.90 CHRONIC OSTEOARTHRITIS: ICD-10-CM

## 2025-06-24 DIAGNOSIS — I25.10 ASHD (ARTERIOSCLEROTIC HEART DISEASE): ICD-10-CM

## 2025-07-20 PROBLEM — F03.B0 MODERATE DEMENTIA (HCC): Status: ACTIVE | Noted: 2025-07-20

## 2025-07-20 PROBLEM — M25.551 RIGHT HIP PAIN: Status: ACTIVE | Noted: 2025-07-20

## 2025-07-20 PROBLEM — E11.9 DIABETES MELLITUS (HCC): Status: ACTIVE | Noted: 2025-07-20

## 2025-07-20 RX ORDER — LORAZEPAM 1 MG/1
1 TABLET ORAL
COMMUNITY

## 2025-07-20 RX ORDER — FUROSEMIDE 40 MG/1
40 TABLET ORAL 2 TIMES DAILY
COMMUNITY

## 2025-07-20 RX ORDER — MORPHINE SULFATE 100 MG/5ML
5-20 SOLUTION ORAL
COMMUNITY

## 2025-07-20 RX ORDER — TRAMADOL HYDROCHLORIDE 50 MG/1
50 TABLET ORAL EVERY 6 HOURS
COMMUNITY

## 2025-07-20 RX ORDER — HALOPERIDOL 1 MG/1
1 TABLET ORAL
COMMUNITY

## 2025-07-20 RX ORDER — INSULIN LISPRO 100 [IU]/ML
10-16 INJECTION, SOLUTION INTRAVENOUS; SUBCUTANEOUS 3 TIMES DAILY
COMMUNITY

## 2025-07-20 RX ORDER — HYOSCYAMINE SULFATE 0.12 MG/1
0.12 TABLET ORAL EVERY 4 HOURS PRN
COMMUNITY

## 2025-07-20 RX ORDER — DULOXETIN HYDROCHLORIDE 60 MG/1
60 CAPSULE, DELAYED RELEASE ORAL NIGHTLY
COMMUNITY

## (undated) DEVICE — 12 ML SYRINGE,LUER-LOCK TIP: Brand: MONOJECT

## (undated) DEVICE — NEEDLE HYPO 25GA L1.5IN BLU POLYPR HUB S STL REG BVL STR

## (undated) DEVICE — Z DISCONTINUED APPLICATOR SURG PREP 0.35OZ 2% CHG 70% ISO ALC W/ HI LT

## (undated) DEVICE — 6 ML SYRINGE LUER-LOCK TIP: Brand: MONOJECT

## (undated) DEVICE — BANDAGE ADH W1XL3IN NAT FAB WVN FLX DURABLE N ADH PD SEAL

## (undated) DEVICE — NEEDLE HYPO 18GA L1.5IN PNK POLYPR HUB S STL THN WALL FILL

## (undated) DEVICE — 3M™ RED DOT™ MONITORING ELECTRODE WITH FOAM TAPE AND STICKY GEL 2560, 50/BAG, 20/CASE, 72/PLT: Brand: RED DOT™